# Patient Record
Sex: FEMALE | Race: WHITE | NOT HISPANIC OR LATINO | Employment: UNEMPLOYED | URBAN - METROPOLITAN AREA
[De-identification: names, ages, dates, MRNs, and addresses within clinical notes are randomized per-mention and may not be internally consistent; named-entity substitution may affect disease eponyms.]

---

## 2018-08-30 ENCOUNTER — OFFICE VISIT (OUTPATIENT)
Dept: INTERNAL MEDICINE | Facility: CLINIC | Age: 58
End: 2018-08-30
Payer: COMMERCIAL

## 2018-08-30 VITALS
DIASTOLIC BLOOD PRESSURE: 80 MMHG | TEMPERATURE: 98.2 F | HEART RATE: 100 BPM | WEIGHT: 188.1 LBS | BODY MASS INDEX: 29.52 KG/M2 | SYSTOLIC BLOOD PRESSURE: 126 MMHG | RESPIRATION RATE: 16 BRPM | OXYGEN SATURATION: 94 % | HEIGHT: 67 IN

## 2018-08-30 DIAGNOSIS — N39.0 URINARY TRACT INFECTION WITH HEMATURIA, SITE UNSPECIFIED: Primary | ICD-10-CM

## 2018-08-30 DIAGNOSIS — R30.0 DYSURIA: ICD-10-CM

## 2018-08-30 DIAGNOSIS — R82.90 NONSPECIFIC FINDING ON EXAMINATION OF URINE: ICD-10-CM

## 2018-08-30 DIAGNOSIS — R31.9 URINARY TRACT INFECTION WITH HEMATURIA, SITE UNSPECIFIED: Primary | ICD-10-CM

## 2018-08-30 LAB
ALBUMIN UR-MCNC: NEGATIVE MG/DL
APPEARANCE UR: ABNORMAL
BACTERIA #/AREA URNS HPF: ABNORMAL /HPF
BILIRUB UR QL STRIP: NEGATIVE
COLOR UR AUTO: YELLOW
GLUCOSE UR STRIP-MCNC: NEGATIVE MG/DL
HGB UR QL STRIP: ABNORMAL
KETONES UR STRIP-MCNC: NEGATIVE MG/DL
LEUKOCYTE ESTERASE UR QL STRIP: ABNORMAL
NITRATE UR QL: NEGATIVE
NON-SQ EPI CELLS #/AREA URNS LPF: ABNORMAL /LPF
PH UR STRIP: 6 PH (ref 5–7)
RBC #/AREA URNS AUTO: ABNORMAL /HPF
SOURCE: ABNORMAL
SP GR UR STRIP: 1.01 (ref 1–1.03)
UROBILINOGEN UR STRIP-ACNC: 0.2 EU/DL (ref 0.2–1)
WBC #/AREA URNS AUTO: ABNORMAL /HPF

## 2018-08-30 PROCEDURE — 81001 URINALYSIS AUTO W/SCOPE: CPT | Performed by: PHYSICIAN ASSISTANT

## 2018-08-30 PROCEDURE — 87088 URINE BACTERIA CULTURE: CPT | Performed by: PHYSICIAN ASSISTANT

## 2018-08-30 PROCEDURE — 87186 SC STD MICRODIL/AGAR DIL: CPT | Performed by: PHYSICIAN ASSISTANT

## 2018-08-30 PROCEDURE — 99203 OFFICE O/P NEW LOW 30 MIN: CPT | Performed by: PHYSICIAN ASSISTANT

## 2018-08-30 PROCEDURE — 87086 URINE CULTURE/COLONY COUNT: CPT | Performed by: PHYSICIAN ASSISTANT

## 2018-08-30 RX ORDER — SULFAMETHOXAZOLE/TRIMETHOPRIM 800-160 MG
1 TABLET ORAL 2 TIMES DAILY
Qty: 6 TABLET | Refills: 0 | Status: SHIPPED | OUTPATIENT
Start: 2018-08-30 | End: 2018-09-02

## 2018-08-30 NOTE — PROGRESS NOTES
"  SUBJECTIVE:   Keena Jorge is a 58 year old female who presents to clinic today for the following health issues:    New Patient/Transfer of Care. Pain and burning with urination x 3 days.    Patient is new to this clinic and has not been seen for the last 4 years due to dealing with family medical issues. She denies any significant medical history. She is here today due to UTI symptoms.     URINARY TRACT SYMPTOMS  Onset: 3 days    Description:   Painful urination (Dysuria): YES  Blood in urine (Hematuria): YES  Delay in urine (Hesitency): no     Intensity: moderate    Progression of Symptoms:  same    Accompanying Signs & Symptoms:  Fever/chills: no   Flank pain no   Nausea and vomiting: no   Any vaginal symptoms: none  Abdominal/Pelvic Pain: no     History:   History of frequent UTI's: no   History of kidney stones: no   Sexually Active: YES  Possibility of pregnancy: No    Precipitating factors:   Nothing     Therapies Tried and outcome: Increase fluid intake    Problem list and histories reviewed & adjusted, as indicated.  Additional history: as documented    BP Readings from Last 3 Encounters:   08/30/18 126/80    Wt Readings from Last 3 Encounters:   08/30/18 188 lb 1.6 oz (85.3 kg)         Reviewed and updated as needed this visit by clinical staff  Tobacco  Allergies  Meds  Med Hx  Surg Hx  Fam Hx  Soc Hx      Reviewed and updated as needed this visit by Provider         ROS:  Constitutional, HEENT, cardiovascular, pulmonary, gi and gu systems are negative, except as otherwise noted.    OBJECTIVE:     /80 (BP Location: Left arm, Patient Position: Chair, Cuff Size: Adult Large)  Pulse 100  Temp 98.2  F (36.8  C) (Oral)  Resp 16  Ht 5' 6.75\" (1.695 m)  Wt 188 lb 1.6 oz (85.3 kg)  SpO2 94%  BMI 29.68 kg/m2  Body mass index is 29.68 kg/(m^2).  GENERAL: healthy, alert and no distress  RESP: lungs clear to auscultation - no rales, rhonchi or wheezes  CV: regular rate and rhythm, normal S1 S2, no " S3 or S4, no murmur, click or rub, no peripheral edema and peripheral pulses strong  ABDOMEN: soft, nontender and bowel sounds normal  MS: no gross musculoskeletal defects noted, no edema  BACK: no CVA tenderness, no paralumbar tenderness    Diagnostic Test Results:  Results for orders placed or performed in visit on 08/30/18 (from the past 24 hour(s))   UA reflex to Microscopic and Culture   Result Value Ref Range    Color Urine Yellow     Appearance Urine Slightly Cloudy     Glucose Urine Negative NEG^Negative mg/dL    Bilirubin Urine Negative NEG^Negative    Ketones Urine Negative NEG^Negative mg/dL    Specific Gravity Urine 1.015 1.003 - 1.035    Blood Urine Large (A) NEG^Negative    pH Urine 6.0 5.0 - 7.0 pH    Protein Albumin Urine Negative NEG^Negative mg/dL    Urobilinogen Urine 0.2 0.2 - 1.0 EU/dL    Nitrite Urine Negative NEG^Negative    Leukocyte Esterase Urine Moderate (A) NEG^Negative    Source Midstream Urine    Urine Microscopic   Result Value Ref Range    WBC Urine 25-50 (A) OTO5^0 - 5 /HPF    RBC Urine  (A) OTO2^O - 2 /HPF    Squamous Epithelial /LPF Urine Few FEW^Few /LPF    Bacteria Urine Moderate (A) NEG^Negative /HPF       ASSESSMENT/PLAN:       ICD-10-CM    1. Urinary tract infection with hematuria, site unspecified N39.0 sulfamethoxazole-trimethoprim (BACTRIM DS/SEPTRA DS) 800-160 MG per tablet    R31.9 Urine Microscopic   2. Dysuria R30.0 UA reflex to Microscopic and Culture     Urine Culture Aerobic Bacterial     phenazopyridine (AZO) 97.5 MG tablet   3. Nonspecific finding on examination of urine R82.90 Urine Culture Aerobic Bacterial       I will treat for UTI and will follow up as indicated with culture results.   See Patient Instructions    Ashley Sol PA-C  [facility]

## 2018-08-30 NOTE — PATIENT INSTRUCTIONS
Understanding Urinary Tract Infections (UTIs)  Most UTIs are caused by bacteria, although they may also be caused by viruses or fungi. Bacteria from the bowel are the most common source of infection. The infection may start because of any of the following:    Sexual activity. During sex, bacteria can travel from the penis, vagina, or rectum into the urethra.     Bacteria on the skin outside the rectum may travel into the urethra. This is more common in women since the rectum and urethra are closer to each other than in men. Wiping from front to back after using the toilet and keeping the area clean can help prevent germs from getting to the urethra.    Blockage of urine flow through the urinary tract. If urine sits too long, germs may start to grow out of control.      Parts of the urinary tract  The infection can occur in any part of the urinary tract.    The kidneys collect and store urine.    The ureters carry urine from the kidneys to the bladder.    The bladder holds urine until you are ready to let it out.    The urethra carries urine from the bladder out of the body. It is shorter in women, so bacteria can move through it more easily. The urethra is longer in men, so a UTI is less likely to reach the bladder or kidneys in men.  Date Last Reviewed: 1/1/2017 2000-2017 [employer]. [address] [place] [address], [place] [address]. All rights reserved. This information is not intended as a substitute for professional medical care. Always follow your healthcare professional's instructions.

## 2018-09-01 LAB
BACTERIA SPEC CULT: ABNORMAL
SPECIMEN SOURCE: ABNORMAL

## 2018-09-20 ENCOUNTER — TELEPHONE (OUTPATIENT)
Dept: INTERNAL MEDICINE | Facility: CLINIC | Age: 58
End: 2018-09-20

## 2018-09-20 NOTE — LETTER
[facility]  [address], [address]  [place] [address]  [phone]        October 4, 2018    Keena Jorge  [address] [address] [facility] [address]            Dear Keena,  We sent you a letter a couple of weeks ago informing you of health maintenance that is due. We hope that you received it. This letter is just a follow up to remind you to schedule an appointment.       Sincerely,        Your [facility] Team

## 2018-09-20 NOTE — LETTER
[facility]  [address], [address]  [place] [address]  [phone]        September 20, 2018    Keena Jorge  [address] [address] [facility] [address]            Dear Ms. Keena Jorge:    In order to ensure we are providing the best quality care, we have reviewed your chart and see that you are due for a physical & pap.  Please call the clinic at your earliest convenience to schedule an appointment.   Thank you for trusting us with your health care.    Sincerely,        Dr. Phoenix Banerjee

## 2018-09-20 NOTE — TELEPHONE ENCOUNTER
Panel Management Review      Patient has the following on her problem list: None      Composite cancer screening  Chart review shows that this patient is due/due soon for the following Pap Smear  Summary:    Patient is due/failing the following:   PAP    Action needed:   Patient needs office visit for a physical & pap.    Type of outreach:    Sent letter.    Questions for provider review:    None                                                                                                                                    Natalee Zamora MA       Chart routed to none .

## 2018-10-22 ENCOUNTER — OFFICE VISIT (OUTPATIENT)
Dept: URGENT CARE | Facility: URGENT CARE | Age: 58
End: 2018-10-22
Payer: COMMERCIAL

## 2018-10-22 ENCOUNTER — RADIANT APPOINTMENT (OUTPATIENT)
Dept: GENERAL RADIOLOGY | Facility: CLINIC | Age: 58
End: 2018-10-22
Attending: PHYSICIAN ASSISTANT
Payer: COMMERCIAL

## 2018-10-22 VITALS
DIASTOLIC BLOOD PRESSURE: 82 MMHG | RESPIRATION RATE: 20 BRPM | SYSTOLIC BLOOD PRESSURE: 128 MMHG | OXYGEN SATURATION: 91 % | TEMPERATURE: 97.4 F | HEART RATE: 90 BPM

## 2018-10-22 DIAGNOSIS — R05.9 COUGH: ICD-10-CM

## 2018-10-22 DIAGNOSIS — R05.9 COUGH: Primary | ICD-10-CM

## 2018-10-22 DIAGNOSIS — R06.2 WHEEZING: ICD-10-CM

## 2018-10-22 PROCEDURE — 71046 X-RAY EXAM CHEST 2 VIEWS: CPT

## 2018-10-22 PROCEDURE — 94640 AIRWAY INHALATION TREATMENT: CPT | Performed by: PHYSICIAN ASSISTANT

## 2018-10-22 PROCEDURE — 99214 OFFICE O/P EST MOD 30 MIN: CPT | Mod: 25 | Performed by: PHYSICIAN ASSISTANT

## 2018-10-22 RX ORDER — PREDNISONE 20 MG/1
40 TABLET ORAL DAILY
Qty: 12 TABLET | Refills: 0 | Status: SHIPPED | OUTPATIENT
Start: 2018-10-22 | End: 2018-10-28

## 2018-10-22 RX ORDER — AZITHROMYCIN 250 MG/1
TABLET, FILM COATED ORAL
Qty: 6 TABLET | Refills: 0 | Status: SHIPPED | OUTPATIENT
Start: 2018-10-22 | End: 2019-08-09

## 2018-10-22 RX ORDER — ALBUTEROL SULFATE 90 UG/1
2 AEROSOL, METERED RESPIRATORY (INHALATION) EVERY 4 HOURS PRN
Qty: 1 INHALER | Refills: 0 | Status: SHIPPED | OUTPATIENT
Start: 2018-10-22 | End: 2019-08-09

## 2018-10-22 RX ORDER — IPRATROPIUM BROMIDE AND ALBUTEROL SULFATE 2.5; .5 MG/3ML; MG/3ML
1 SOLUTION RESPIRATORY (INHALATION) ONCE
Qty: 1 VIAL | Refills: 0
Start: 2018-10-22 | End: 2019-08-09

## 2018-10-22 NOTE — PROGRESS NOTES
SUBJECTIVE:  Keena Jorge is a 58 year old female who presents to the clinic today with a chief complaint of cough for a few weeks but seems to be worse at night and now with some wheezing and slightly SOB.  Has no underlying respiratory disease or asthma  No fevers Her cough is described as persistent, nonproductive, spasmodic and wheezing.    The patient's symptoms are moderate and worsening.  Associated symptoms include none. The patient's symptoms are exacerbated by lying down  Patient has been using OTC cough suppressants  to improve symptoms.    Generally  Healthy and no underlying medical issues and takes no daily med.      No past medical history on file.    Current Outpatient Prescriptions   Medication Sig Dispense Refill     phenazopyridine (AZO) 97.5 MG tablet Take 2 tablets (195 mg) by mouth 3 times daily (Patient not taking: Reported on 10/22/2018) 12 tablet 0       Social History   Substance Use Topics     Smoking status: Former Smoker     Types: Other     Quit date: 8/30/2016     Smokeless tobacco: Current User     Alcohol use Yes       ROS  Review of systems negative except as stated above.    OBJECTIVE:  /82 (BP Location: Right arm, Patient Position: Chair, Cuff Size: Adult Regular)  Pulse 90  Temp 97.4  F (36.3  C) (Tympanic)  Resp 20  SpO2 91%  GENERAL APPEARANCE: healthy, alert and no distress  EYES: EOMI,  PERRL, conjunctiva clear  HENT: ear canals and TM's normal.  Nose and mouth without ulcers, erythema or lesions  NECK: supple, nontender, no lymphadenopathy  RESP: wheezing diffusely throughout.  Course rhonchi noted.  No retractions.  CV: regular rates and rhythm, normal S1 S2, no murmur noted  NEURO: Normal strength and tone, sensory exam grossly normal,  normal speech and mentation  SKIN: no suspicious lesions or rashes    NEB:  Duoneb in the clinic with improved lung sound and repeat O2 95%.      Chest x-ray - no infiltrate noted.     assessment/plan:  (R05) Cough  (primary  encounter diagnosis)  Comment:   Plan: XR Chest 2 Views, predniSONE (DELTASONE) 20 MG         tablet, albuterol (PROAIR HFA/PROVENTIL         HFA/VENTOLIN HFA) 108 (90 Base) MCG/ACT         inhaler, azithromycin (ZITHROMAX) 250 MG tablet          Burst of Prednisone, inhaler and start Zithromax if sx worsen or fevers develop.  Red flag signs reviewed and OTC med for sx relief.  To Follow-up with PCP as needed     (R06.2) Wheezing  Comment:   Plan: INHALATION/NEBULIZER TREATMENT, INITIAL,         ipratropium - albuterol 0.5 mg/2.5 mg/3 mL         (DUONEB) 0.5-2.5 (3) MG/3ML neb solution,         predniSONE (DELTASONE) 20 MG tablet, albuterol         (PROAIR HFA/PROVENTIL HFA/VENTOLIN HFA) 108 (90        Base) MCG/ACT inhaler         As above

## 2019-04-24 ENCOUNTER — TELEPHONE (OUTPATIENT)
Dept: INTERNAL MEDICINE | Facility: CLINIC | Age: 59
End: 2019-04-24

## 2019-04-24 NOTE — LETTER
[facility]  [address], [address]  [place], [place]  [address]                                            TEL:[phone]  FAX:[phone]      Keena Jorge  [address] [address] [facility] [address]      April 29, 2019    Dear Keena,          At [facility], we care about your health and well-being. A review of your chart has indicated that you are due for a fasting physical, mammogram and colonoscopy. Please contact us at [phone] to schedule an appointment.     If you have already had one or all of the above screening tests at another facility, please call us to update your chart.        Sincerely,      [facility]

## 2019-04-29 NOTE — TELEPHONE ENCOUNTER
Panel Management Review      Patient has the following on her problem list: None      Composite cancer screening  Chart review shows that this patient is due/due soon for the following Mammogram and Colonoscopy  Summary:    Patient is due/failing the following:   COLONOSCOPY, MAMMOGRAM and PHYSICAL    Action needed:   Patient needs office visit for as above.    Type of outreach:    Sent letter.    Questions for provider review:    None                                                                                                                                    RUDDY Gong LPN       Chart routed to none.

## 2019-05-09 ENCOUNTER — OFFICE VISIT (OUTPATIENT)
Dept: PEDIATRICS | Facility: CLINIC | Age: 59
End: 2019-05-09
Payer: COMMERCIAL

## 2019-05-09 VITALS
HEART RATE: 106 BPM | OXYGEN SATURATION: 92 % | TEMPERATURE: 98.7 F | BODY MASS INDEX: 29.93 KG/M2 | WEIGHT: 189.7 LBS | SYSTOLIC BLOOD PRESSURE: 136 MMHG | DIASTOLIC BLOOD PRESSURE: 76 MMHG

## 2019-05-09 DIAGNOSIS — J06.9 VIRAL URI WITH COUGH: Primary | ICD-10-CM

## 2019-05-09 DIAGNOSIS — J44.1 COPD EXACERBATION (H): ICD-10-CM

## 2019-05-09 PROCEDURE — 99214 OFFICE O/P EST MOD 30 MIN: CPT | Performed by: FAMILY MEDICINE

## 2019-05-09 RX ORDER — PREDNISONE 20 MG/1
TABLET ORAL
Qty: 13 TABLET | Refills: 0 | Status: SHIPPED | OUTPATIENT
Start: 2019-05-10 | End: 2019-08-09

## 2019-05-09 RX ORDER — AZITHROMYCIN 250 MG/1
TABLET, FILM COATED ORAL
Qty: 6 TABLET | Refills: 0 | Status: SHIPPED | OUTPATIENT
Start: 2019-05-09 | End: 2019-08-09

## 2019-05-09 RX ORDER — PREDNISONE 10 MG/1
40 TABLET ORAL ONCE
Status: COMPLETED | OUTPATIENT
Start: 2019-05-09 | End: 2019-05-09

## 2019-05-09 RX ADMIN — PREDNISONE 40 MG: 10 TABLET ORAL at 15:26

## 2019-05-09 NOTE — PROGRESS NOTES
Subjective:   Keena Jorge is a 58 year old female who presents for   Chief Complaint   Patient presents with     Cough     Cough for x weeks - SOB mostly at night - same symptoms as 10/2018 visit     Denies fevers during this illness. No other sick individuals at home. No vomiting/diarrhea. She is feeling short of breath. No swelling of lower extremities.   No prescribed inhalers. Has not had PFTs done recently or ever. Does not have a PCP she identifies with.     Quit smoking 2 years ago - approximately 40 year smoking history but was averaging 1 pack per week.   Meds attempted: none  SH: [employer], not currently looking for work    There are no active problems to display for this patient.      Current Outpatient Medications   Medication     albuterol (PROAIR RESPICLICK) 108 (90 Base) MCG/ACT inhaler     azithromycin (ZITHROMAX) 250 MG tablet     [START ON 5/10/2019] predniSONE (DELTASONE) 20 MG tablet     albuterol (PROAIR HFA/PROVENTIL HFA/VENTOLIN HFA) 108 (90 Base) MCG/ACT inhaler     azithromycin (ZITHROMAX) 250 MG tablet     ipratropium - albuterol 0.5 mg/2.5 mg/3 mL (DUONEB) 0.5-2.5 (3) MG/3ML neb solution     No current facility-administered medications for this visit.        ROS:  As above per HPI    Objective:   /76 (BP Location: Right arm, Patient Position: Sitting, Cuff Size: Adult Regular)   Pulse 106   Temp 98.7  F (37.1  C) (Oral)   Wt 86 kg (189 lb 11.2 oz)   SpO2 92%   BMI 29.93 kg/m  , Body mass index is 29.93 kg/m .  Gen:  NAD, well-nourished, sitting in chair comfortably, normal mentation  HEENT: EOMI, sclera anicteric, Head normocephalic, ; nares patent; moist mucous membranes  Neck: trachea midline, no thyromegaly  CV:  Hemodynamically stable, RRR  Pulm:  Slight increased work of breathing, inspiratory wheezing in all fields, no obvious crackles  Chest: barrel chested  Extrem: no cyanosis, edema or clubbing  Skin: no obvious rashes or abnormalities  Psych: Euthymic, linear  thoughts, normal rate of speech  Gait: normal      Assessment & Plan:   Keena Jorge, 58 year old female who presents with:    Viral URI with cough  Suspected COPD exacerbation (H)  40mg of prednisone and a duoneb treatment were given at 1500  Outpatient plan: PRN albuterol, 10 day steroid taper prednisone, azithromycin for 5 days (suspected COPD exacerbation), OTC cough suppressants.   Has been absent of fevers. Previous visit her o2 level was in the low 90's. Today her O2 sat improved from 92-> 96% after above treatment which is encouraging.     Patient encouraged to have formal PFT testing done as outpatient once she gets better.     - azithromycin (ZITHROMAX) 250 MG tablet  Dispense: 6 tablet; Refill: 0  - albuterol (PROAIR RESPICLICK) 108 (90 Base) MCG/ACT inhaler  Dispense: 1 Inhaler; Refill: 2  - predniSONE (DELTASONE) 20 MG tablet  Dispense: 13 tablet; Refill: 0      George Carter MD   [facility]    The use of Dragon/[employer] dictation services may have been used to construct the content in this note; any grammatical or spelling errors are non-intentional. Please contact the author of this note directly if you are in need of any clarification.

## 2019-05-09 NOTE — PATIENT INSTRUCTIONS
Albuterol 2 puffs every 4-6 hours for shortness of breath    Take prednisone 40mg for 4 days then 20mg for 5 days    Take azithromycin for 5 days (2 pills on day 1)    For cough:   - robitussin (dextromethorphan)  - honey lozenges/remedies    Return if you have difficulty breathing, fevers, pain with breathing, or feel lightheaded      Make appointment with a Doctor in 2 weeks to review your health history. You would benefit from having a pulmonary function test done.

## 2019-05-09 NOTE — PROGRESS NOTES
The following nebulizer treatment was given:     MEDICATION: Duoneb  : [employer]  LOT #: [id number]  EXPIRATION DATE:  10/20  NDC # 9674-6024-55     Khalif Rankin MA on 5/9/2019 at 3:06 PM

## 2019-07-23 ENCOUNTER — OFFICE VISIT (OUTPATIENT)
Dept: URGENT CARE | Facility: URGENT CARE | Age: 59
End: 2019-07-23
Payer: COMMERCIAL

## 2019-07-23 VITALS
OXYGEN SATURATION: 95 % | HEART RATE: 82 BPM | RESPIRATION RATE: 20 BRPM | BODY MASS INDEX: 29.82 KG/M2 | DIASTOLIC BLOOD PRESSURE: 78 MMHG | SYSTOLIC BLOOD PRESSURE: 138 MMHG | WEIGHT: 189 LBS | TEMPERATURE: 97.4 F

## 2019-07-23 DIAGNOSIS — R10.11 RIGHT UPPER QUADRANT PAIN: ICD-10-CM

## 2019-07-23 DIAGNOSIS — R10.13 EPIGASTRIC PAIN: Primary | ICD-10-CM

## 2019-07-23 PROCEDURE — 99214 OFFICE O/P EST MOD 30 MIN: CPT | Performed by: FAMILY MEDICINE

## 2019-07-23 ASSESSMENT — ENCOUNTER SYMPTOMS
COUGH: 0
SHORTNESS OF BREATH: 0
DYSURIA: 0
FEVER: 0

## 2019-07-23 NOTE — PATIENT INSTRUCTIONS
Please follow up tomorrow for an ultrasound to look at your gallbladder.  This is scheduled at [facility] in [place] at 1:40 pm.  Please do not eat or drink anything for 8 hours before this.    One of my colleagues will contact you about the results and any needed follow up once the radiologist has looked at the ultrasound images.    Tonight, avoid fatty foods and try to drink plenty of clear fluids.    If pain worsens overnight, please seek care in the ER.

## 2019-07-24 ENCOUNTER — HOSPITAL ENCOUNTER (OUTPATIENT)
Dept: ULTRASOUND IMAGING | Facility: CLINIC | Age: 59
Discharge: HOME | End: 2019-07-24
Attending: FAMILY MEDICINE | Admitting: FAMILY MEDICINE
Payer: COMMERCIAL

## 2019-07-24 DIAGNOSIS — R10.13 EPIGASTRIC PAIN: ICD-10-CM

## 2019-07-24 DIAGNOSIS — R10.11 RIGHT UPPER QUADRANT PAIN: ICD-10-CM

## 2019-07-24 PROCEDURE — 76705 ECHO EXAM OF ABDOMEN: CPT

## 2019-08-09 ENCOUNTER — OFFICE VISIT (OUTPATIENT)
Dept: PEDIATRICS | Facility: CLINIC | Age: 59
End: 2019-08-09
Payer: COMMERCIAL

## 2019-08-09 ENCOUNTER — ANCILLARY PROCEDURE (OUTPATIENT)
Dept: MAMMOGRAPHY | Facility: CLINIC | Age: 59
End: 2019-08-09
Attending: INTERNAL MEDICINE
Payer: COMMERCIAL

## 2019-08-09 VITALS
DIASTOLIC BLOOD PRESSURE: 78 MMHG | OXYGEN SATURATION: 95 % | HEART RATE: 76 BPM | HEIGHT: 67 IN | SYSTOLIC BLOOD PRESSURE: 112 MMHG | BODY MASS INDEX: 28.72 KG/M2 | WEIGHT: 183 LBS

## 2019-08-09 DIAGNOSIS — Z83.49 FAMILY HISTORY OF THYROID DISEASE: ICD-10-CM

## 2019-08-09 DIAGNOSIS — Z12.4 SCREENING FOR CERVICAL CANCER: ICD-10-CM

## 2019-08-09 DIAGNOSIS — R19.5 POSITIVE FIT (FECAL IMMUNOCHEMICAL TEST): ICD-10-CM

## 2019-08-09 DIAGNOSIS — Z87.891 FORMER SMOKER: ICD-10-CM

## 2019-08-09 DIAGNOSIS — R06.2 WHEEZING: ICD-10-CM

## 2019-08-09 DIAGNOSIS — Z11.59 NEED FOR HEPATITIS C SCREENING TEST: ICD-10-CM

## 2019-08-09 DIAGNOSIS — I67.1 ANEURYSM OF LEFT INTERNAL CAROTID ARTERY: ICD-10-CM

## 2019-08-09 DIAGNOSIS — Z12.31 ENCOUNTER FOR SCREENING MAMMOGRAM FOR BREAST CANCER: ICD-10-CM

## 2019-08-09 DIAGNOSIS — Z23 NEED FOR TETANUS BOOSTER: ICD-10-CM

## 2019-08-09 DIAGNOSIS — Z13.220 SCREENING FOR HYPERLIPIDEMIA: ICD-10-CM

## 2019-08-09 DIAGNOSIS — Z13.1 SCREENING FOR DIABETES MELLITUS: ICD-10-CM

## 2019-08-09 DIAGNOSIS — D17.30 LIPOMA OF SKIN AND SUBCUTANEOUS TISSUE: ICD-10-CM

## 2019-08-09 DIAGNOSIS — E66.3 OVERWEIGHT (BMI 25.0-29.9): ICD-10-CM

## 2019-08-09 DIAGNOSIS — Z12.11 SPECIAL SCREENING FOR MALIGNANT NEOPLASMS, COLON: ICD-10-CM

## 2019-08-09 DIAGNOSIS — Z11.4 SCREENING FOR HIV WITHOUT PRESENCE OF RISK FACTORS: ICD-10-CM

## 2019-08-09 DIAGNOSIS — Z00.00 ROUTINE GENERAL MEDICAL EXAMINATION AT A HEALTH CARE FACILITY: Primary | ICD-10-CM

## 2019-08-09 PROBLEM — K64.4 EXTERNAL HEMORRHOIDS: Status: ACTIVE | Noted: 2019-08-09

## 2019-08-09 LAB
ALBUMIN SERPL-MCNC: 3.7 G/DL (ref 3.4–5)
ALP SERPL-CCNC: 84 U/L (ref 40–150)
ALT SERPL W P-5'-P-CCNC: 38 U/L (ref 0–50)
ANION GAP SERPL CALCULATED.3IONS-SCNC: 6 MMOL/L (ref 3–14)
AST SERPL W P-5'-P-CCNC: 18 U/L (ref 0–45)
BILIRUB SERPL-MCNC: 0.3 MG/DL (ref 0.2–1.3)
BUN SERPL-MCNC: 9 MG/DL (ref 7–30)
CALCIUM SERPL-MCNC: 9.1 MG/DL (ref 8.5–10.1)
CHLORIDE SERPL-SCNC: 108 MMOL/L (ref 94–109)
CHOLEST SERPL-MCNC: 210 MG/DL
CO2 SERPL-SCNC: 27 MMOL/L (ref 20–32)
CREAT SERPL-MCNC: 0.81 MG/DL (ref 0.52–1.04)
ERYTHROCYTE [DISTWIDTH] IN BLOOD BY AUTOMATED COUNT: 13.1 % (ref 10–15)
FEF 25/75: NORMAL
FEV-1: NORMAL
FEV1/FVC: NORMAL
FVC: NORMAL
GFR SERPL CREATININE-BSD FRML MDRD: 80 ML/MIN/{1.73_M2}
GLUCOSE SERPL-MCNC: 98 MG/DL (ref 70–99)
HBA1C MFR BLD: 5.5 % (ref 0–5.6)
HCT VFR BLD AUTO: 42.8 % (ref 35–47)
HCV AB SERPL QL IA: NONREACTIVE
HDLC SERPL-MCNC: 43 MG/DL
HGB BLD-MCNC: 13.9 G/DL (ref 11.7–15.7)
HIV 1+2 AB+HIV1 P24 AG SERPL QL IA: NONREACTIVE
LDLC SERPL CALC-MCNC: 137 MG/DL
MCH RBC QN AUTO: 28.9 PG (ref 26.5–33)
MCHC RBC AUTO-ENTMCNC: 32.5 G/DL (ref 31.5–36.5)
MCV RBC AUTO: 89 FL (ref 78–100)
NONHDLC SERPL-MCNC: 167 MG/DL
PLATELET # BLD AUTO: 215 10E9/L (ref 150–450)
POTASSIUM SERPL-SCNC: 3.9 MMOL/L (ref 3.4–5.3)
PROT SERPL-MCNC: 6.8 G/DL (ref 6.8–8.8)
RBC # BLD AUTO: 4.81 10E12/L (ref 3.8–5.2)
SODIUM SERPL-SCNC: 141 MMOL/L (ref 133–144)
TRIGL SERPL-MCNC: 152 MG/DL
TSH SERPL DL<=0.005 MIU/L-ACNC: 1.09 MU/L (ref 0.4–4)
WBC # BLD AUTO: 5.7 10E9/L (ref 4–11)

## 2019-08-09 PROCEDURE — 90471 IMMUNIZATION ADMIN: CPT | Performed by: INTERNAL MEDICINE

## 2019-08-09 PROCEDURE — 36415 COLL VENOUS BLD VENIPUNCTURE: CPT | Performed by: INTERNAL MEDICINE

## 2019-08-09 PROCEDURE — 90715 TDAP VACCINE 7 YRS/> IM: CPT | Performed by: INTERNAL MEDICINE

## 2019-08-09 PROCEDURE — 84443 ASSAY THYROID STIM HORMONE: CPT | Performed by: INTERNAL MEDICINE

## 2019-08-09 PROCEDURE — 80061 LIPID PANEL: CPT | Performed by: INTERNAL MEDICINE

## 2019-08-09 PROCEDURE — 83036 HEMOGLOBIN GLYCOSYLATED A1C: CPT | Performed by: INTERNAL MEDICINE

## 2019-08-09 PROCEDURE — 77067 SCR MAMMO BI INCL CAD: CPT | Mod: TC

## 2019-08-09 PROCEDURE — 87624 HPV HI-RISK TYP POOLED RSLT: CPT | Performed by: INTERNAL MEDICINE

## 2019-08-09 PROCEDURE — 87389 HIV-1 AG W/HIV-1&-2 AB AG IA: CPT | Performed by: INTERNAL MEDICINE

## 2019-08-09 PROCEDURE — 86803 HEPATITIS C AB TEST: CPT | Performed by: INTERNAL MEDICINE

## 2019-08-09 PROCEDURE — 99396 PREV VISIT EST AGE 40-64: CPT | Mod: 25 | Performed by: INTERNAL MEDICINE

## 2019-08-09 PROCEDURE — 85027 COMPLETE CBC AUTOMATED: CPT | Performed by: INTERNAL MEDICINE

## 2019-08-09 PROCEDURE — G0145 SCR C/V CYTO,THINLAYER,RESCR: HCPCS | Performed by: INTERNAL MEDICINE

## 2019-08-09 PROCEDURE — 94010 BREATHING CAPACITY TEST: CPT | Performed by: INTERNAL MEDICINE

## 2019-08-09 PROCEDURE — 99213 OFFICE O/P EST LOW 20 MIN: CPT | Mod: 25 | Performed by: INTERNAL MEDICINE

## 2019-08-09 PROCEDURE — 80053 COMPREHEN METABOLIC PANEL: CPT | Performed by: INTERNAL MEDICINE

## 2019-08-09 RX ORDER — FLUTICASONE PROPIONATE 44 UG/1
1 AEROSOL, METERED RESPIRATORY (INHALATION) 2 TIMES DAILY
Qty: 1 INHALER | Refills: 2 | Status: SHIPPED | OUTPATIENT
Start: 2019-08-09 | End: 2020-11-10

## 2019-08-09 ASSESSMENT — ENCOUNTER SYMPTOMS
HEADACHES: 0
NAUSEA: 0
HEARTBURN: 0
DIZZINESS: 0
SORE THROAT: 0
JOINT SWELLING: 0
HEMATURIA: 0
HEMATOCHEZIA: 0
PALPITATIONS: 0
SHORTNESS OF BREATH: 1
COUGH: 1
WEAKNESS: 0
NERVOUS/ANXIOUS: 0
DYSURIA: 0
DIARRHEA: 0
CHILLS: 0
CONSTIPATION: 0
EYE PAIN: 0
PARESTHESIAS: 0
FEVER: 0
BREAST MASS: 0
ABDOMINAL PAIN: 0
MYALGIAS: 0
FREQUENCY: 0
ARTHRALGIAS: 1

## 2019-08-09 ASSESSMENT — MIFFLIN-ST. JEOR: SCORE: 1442.94

## 2019-08-09 NOTE — PATIENT INSTRUCTIONS
It was nice to see you in clinic.    I'll be in touch with your lab results in the next 1-2 weeks.    I will review your Imaging from [facility] and be in touch with any recommendations regarding the aneurysm. We may want you to meet with a specialist again.     Mammogram - please call our office to schedule this.     Send back your FIT test - we'll do this every year for colon cancer screening.    For the breathing  - let's start a daily inhaler to see if this helps you use your albuterol less  - start flovent - 1 puff twice daily. After 2 weeks if not better can increase to 2 puffs twice daiy.   - okay to take your albuterol before swimming since you know chlorine is bothersome  - I will be in touch if your breathing test changes our plans at all  - I'll see you in 6 weeks to check in on how you're doing        If you are over 50 I recommend the new Shingrix vaccine. Two doses of Shingrix provides more than 90% protection against shingles and postherpetic neuralgia (PHN), a type of chronic pain that is the most common complication of shingles.   - even if you've had the older shingles vaccine (Zostavax) it's okay to get the new vaccine.   - even if you've had singles before the vaccine can be helpful.    Typically the best (and cheapest!) place to get this vaccine is our pharmacy downstairs. You do not need an appointment and can walk in any time they are open. The vaccine is a two shot series - I recommend getting the second shot 2-6 months after the first dose.    You may have a sore arm and feel mild flu-like symptoms for a day or two after the vaccine. Most people do not need to adjust their regular activities. It's okay to take tylenol or ibuprofen if you have side effects.     Preventive Health Recommendations  Female Ages 50 - 64    Yearly exam: See your health care provider every year in order to  o Review health changes.   o Discuss preventive care.    o Review your medicines if your doctor has  prescribed any.      Get a Pap test every three years (unless you have an abnormal result and your provider advises testing more often).    If you get Pap tests with HPV test, you only need to test every 5 years, unless you have an abnormal result.     You do not need a Pap test if your uterus was removed (hysterectomy) and you have not had cancer.    You should be tested each year for STDs (sexually transmitted diseases) if you're at risk.     Have a mammogram every 1 to 2 years.    Have a colonoscopy at age 50, or have a yearly FIT test (stool test). These exams screen for colon cancer.      Have a cholesterol test every 5 years, or more often if advised.    Have a diabetes test (fasting glucose) every three years. If you are at risk for diabetes, you should have this test more often.     If you are at risk for osteoporosis (brittle bone disease), think about having a bone density scan (DEXA).    Shots: Get a flu shot each year. Get a tetanus shot every 10 years.    Nutrition:     Eat at least 5 servings of fruits and vegetables each day.    Eat whole-grain bread, whole-wheat pasta and brown rice instead of white grains and rice.    Get adequate Calcium and Vitamin D.     Lifestyle    Exercise at least 150 minutes a week (30 minutes a day, 5 days a week). This will help you control your weight and prevent disease.    Limit alcohol to one drink per day.    No smoking.     Wear sunscreen to prevent skin cancer.     See your dentist every six months for an exam and cleaning.    See your eye doctor every 1 to 2 years.

## 2019-08-09 NOTE — LETTER
August 16, 2019    Keena Jorge  [address] [address] [facility] [address]    Dear ,  This letter is regarding your recent Pap smear (cervical cancer screening) and Human Papillomavirus (HPV) test.  We are happy to inform you that your Pap smear result is normal. Cervical cancer is closely linked with certain types of HPV. Your results showed no evidence of high-risk HPV.  We recommend you have your next PAP smear and HPV test in 5 years.  You will still need to return to the clinic every year for an annual exam and other preventive tests.  If you have additional questions regarding this result, please call our registered nurse, Francine at [phone].  Sincerely,    Cesar Jean MD /[facility]

## 2019-08-09 NOTE — LETTER
[facility]  [address] [address]                  [phone]   August 12, 2019    Keena Jorge  [address] [address] [facility] [address]      Dear Keena,      It was nice to see you in clinic. Enclosed are your lab results.     Your blood counts were normal.     Your hemoglobin a1c (screens for diabetes) was normal.     Your electrolytes, kidney function, and liver enzymes were normal.     Your cholesterol is high but still okay. Your 10-year ASCVD risk score (risk of heart attack or stroke in the next 10 years) is: 5.5% and the goal is <7.5%. Keep working on the diet/exercise like you are. For now I would not recommend any medications.   The 10-year ASCVD risk score (Timmyshalini SHIPLEY Jr., et al., 2013) is: 5.5%     Values used to calculate the score:       Age: 58 years       Sex: Female       Is Non- : No       Diabetic: No       Tobacco smoker: Yes       Systolic Blood Pressure: 112 mmHg       Is BP treated: No       HDL Cholesterol: 43 mg/dL       Total Cholesterol: 210 mg/dL     Your thyroid function was normal. With your family history we'll plan on checking this every few years.     Your standard one-time screening for hepatitis C (based on birth year) was negative.     Your standard one-time screening for HIV was negative.     Your test results are enclosed.      Please contact me if you have any questions.           Thank you very much for choosing [facility]    Best TROY garcia MD        Results for orders placed or performed in visit on 08/09/19   Spirometry, Breathing Capacity: Normal Order, Clinic Performed   Result Value Ref Range    FEV-1      FVC      FEV1/FVC      FEF 25/75     Hepatitis C Screen Reflex to HCV RNA Quant and Genotype   Result Value Ref Range    Hepatitis C Antibody Nonreactive NR^Nonreactive   HIV Screening   Result Value Ref Range    HIV Antigen Antibody Combo Nonreactive  NR^Nonreactive       Lipid panel reflex to direct LDL Fasting   Result Value Ref Range    Cholesterol 210 (H) <200 mg/dL    Triglycerides 152 (H) <150 mg/dL    HDL Cholesterol 43 (L) >49 mg/dL    LDL Cholesterol Calculated 137 (H) <100 mg/dL    Non HDL Cholesterol 167 (H) <130 mg/dL   Comprehensive metabolic panel   Result Value Ref Range    Sodium 141 133 - 144 mmol/L    Potassium 3.9 3.4 - 5.3 mmol/L    Chloride 108 94 - 109 mmol/L    Carbon Dioxide 27 20 - 32 mmol/L    Anion Gap 6 3 - 14 mmol/L    Glucose 98 70 - 99 mg/dL    Urea Nitrogen 9 7 - 30 mg/dL    Creatinine 0.81 0.52 - 1.04 mg/dL    GFR Estimate 80 >60 mL/min/[1.73_m2]    GFR Estimate If Black >90 >60 mL/min/[1.73_m2]    Calcium 9.1 8.5 - 10.1 mg/dL    Bilirubin Total 0.3 0.2 - 1.3 mg/dL    Albumin 3.7 3.4 - 5.0 g/dL    Protein Total 6.8 6.8 - 8.8 g/dL    Alkaline Phosphatase 84 40 - 150 U/L    ALT 38 0 - 50 U/L    AST 18 0 - 45 U/L   CBC with platelets   Result Value Ref Range    WBC 5.7 4.0 - 11.0 10e9/L    RBC Count 4.81 3.8 - 5.2 10e12/L    Hemoglobin 13.9 11.7 - 15.7 g/dL    Hematocrit 42.8 35.0 - 47.0 %    MCV 89 78 - 100 fl    MCH 28.9 26.5 - 33.0 pg    MCHC 32.5 31.5 - 36.5 g/dL    RDW 13.1 10.0 - 15.0 %    Platelet Count 215 150 - 450 10e9/L   TSH with free T4 reflex   Result Value Ref Range    TSH 1.09 0.40 - 4.00 mU/L   Hemoglobin A1c   Result Value Ref Range    Hemoglobin A1C 5.5 0 - 5.6 %

## 2019-08-09 NOTE — PROGRESS NOTES
SUBJECTIVE:   CC: Keena Jorge is an 58 year old woman who presents for preventive health visit.     Healthy Habits:     Getting at least 3 servings of Calcium per day:  NO    Bi-annual eye exam:  Yes    Dental care twice a year:  NO    Sleep apnea or symptoms of sleep apnea:  None    Diet:  Regular (no restrictions)    Frequency of exercise:  2-3 days/week    Duration of exercise:  Less than 15 minutes    Taking medications regularly:  Yes    Medication side effects:  Not applicable    PHQ-2 Total Score: 0    Additional concerns today:  Yes    # Breathing  - wonders about developing asthma  - chlorine is really bothersome (recently jointed the Y)  - taking albuterol throughout the week - most days uses it at least once. Some days more than once .  - wonders if humidity plays a role  - started last November - felt like she couldn't catch her breath  - doesn't feel sob   - no unexpected weight loss  - albuterol does help    # smoking  - quit smoking on December 29, 2017 - was 16 when she started, avg 1 ppd  - vapes infrequently - has a rafaela that makes it for her, only 3 mg.    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 Normal MR appearing internal auditory canals, cerebellopontine angles and labyrinthine structures bilaterally.  2. Minimal nonspecific supratentorial white matter T2 hyperintensity commonly attributed to chronic microvascular disease, age concordant and stable.  3. No posterior fossa lesion or acute abnormality.  - MRA Brain: 1.  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 2.  Mild chronic microvascular ischemic change.  - Dr. Luevano (Neuro) notes - I discussed with Dr. Shelley yesterday from [facility] who did review her MRA. My office will schedule an appointment with him to discuss the  option of coiling the aneurysm.  - She remembers seeing someone in [place] - does not recall the ultimate recommendation.    # HLD  - was on simvastatin in the past. Felt like she had side effects from this. Hesitant to consider another medication. Would like to work on diet/exercise.    # Social  - moved up to [place] in , in  moved back to [place] to care for her Mom, back to [place] in     Today's PHQ-2 Score:   PHQ-2 (  [employer]) 2019   Q1: Little interest or pleasure in doing things 0   Q2: Feeling down, depressed or hopeless 0   PHQ-2 Score 0   Q1: Little interest or pleasure in doing things Not at all   Q2: Feeling down, depressed or hopeless Not at all   PHQ-2 Score 0       Abuse: Current or Past(Physical, Sexual or Emotional)- No  Do you feel safe in your environment? Yes    Social History     Tobacco Use     Smoking status: Current Every Day Smoker     Types: Other     Last attempt to quit: 2016     Years since quittin.9     Smokeless tobacco: Never Used     Tobacco comment: Vape   Substance Use Topics     Alcohol use: Yes     Alcohol Use 2019   Prescreen: >3 drinks/day or >7 drinks/week? No     Reviewed orders with patient.  Reviewed health maintenance and updated orders accordingly - Yes  There is no problem list on file for this patient.    Past Surgical History:   Procedure Laterality Date     TONSILLECTOMY      2nd grade       Social History     Tobacco Use     Smoking status: Current Every Day Smoker     Types: Other     Last attempt to quit: 2016     Years since quittin.9     Smokeless tobacco: Never Used     Tobacco comment: Vape   Substance Use Topics     Alcohol use: Yes     Family History   Problem Relation Age of Onset     Thyroid Disease Mother         Grave's     Myocardial Infarction Mother 72     Chronic Obstructive Pulmonary Disease Father         smoker     Diabetes Maternal Grandmother 75     Lung Cancer Paternal Grandfather         non-smoker     Lymphoma  Brother      Thyroid Disease Sister      Diabetes Brother 50     Thyroid Disease Sister         Doesn't do anything about it     Thyroid Disease Sister         Borderline     Colon Cancer No family hx of      Breast Cancer No family hx of          Current Outpatient Medications   Medication Sig Dispense Refill     albuterol (PROAIR RESPICLICK) 108 (90 Base) MCG/ACT inhaler Inhale 2 puffs into the lungs every 4 hours as needed 1 Inhaler 3     fluticasone (FLOVENT HFA) 44 MCG/ACT inhaler Inhale 1 puff into the lungs 2 times daily 1 Inhaler 2     No Known Allergies    Mammogram Screening: Patient over age 50, mutual decision to screen reflected in health maintenance.    Pertinent mammograms are reviewed under the imaging tab.  History of abnormal Pap smear: NO - age 30-65 PAP every 5 years with negative HPV co-testing recommended     Reviewed and updated as needed this visit by clinical staff  Tobacco  Allergies  Meds  Med Hx  Surg Hx  Fam Hx  Soc Hx        Reviewed and updated as needed this visit by Provider  Meds  Med Hx  Surg Hx  Fam Hx        History reviewed. No pertinent past medical history.   Past Surgical History:   Procedure Laterality Date     TONSILLECTOMY      2nd grade       Review of Systems   Constitutional: Negative for chills and fever.   HENT: Positive for congestion. Negative for ear pain, hearing loss and sore throat.    Eyes: Negative for pain and visual disturbance.   Respiratory: Positive for cough and shortness of breath.    Cardiovascular: Negative for chest pain, palpitations and peripheral edema.   Gastrointestinal: Negative for abdominal pain, constipation, diarrhea, heartburn, hematochezia and nausea.   Breasts:  Negative for tenderness, breast mass and discharge.   Genitourinary: Negative for dysuria, frequency, genital sores, hematuria, pelvic pain, urgency, vaginal bleeding and vaginal discharge.   Musculoskeletal: Positive for arthralgias. Negative for joint swelling and  "myalgias.   Skin: Negative for rash.   Neurological: Negative for dizziness, weakness, headaches and paresthesias.   Psychiatric/Behavioral: Negative for mood changes. The patient is not nervous/anxious.      OBJECTIVE:   /78 (BP Location: Right arm, Patient Position: Sitting)   Pulse 76   Ht 1.694 m (5' 6.7\")   Wt 83 kg (183 lb)   SpO2 95%   BMI 28.92 kg/m       Physical Exam  GENERAL: healthy, alert and no distress  EYES: Eyes grossly normal to inspection, PERRL and conjunctivae and sclerae normal  HENT: ear canals and TM's normal, nose and mouth without ulcers or lesions  NECK: no adenopathy, no asymmetry, masses, or scars and thyroid normal to palpation  RESP: lungs clear to auscultation - no rales, rhonchi or wheezes  BREAST: normal without masses, tenderness or nipple discharge and no palpable axillary masses or adenopathy  CV: regular rate and rhythm, normal S1 S2, no S3 or S4, no murmur, click or rub, no peripheral edema and peripheral pulses strong  ABDOMEN: soft, nontender, no hepatosplenomegaly, no masses and bowel sounds normal   (female): normal female external genitalia, normal urethral meatus, vaginal mucosa pink, moist, well rugated, and normal cervix/adnexa/uterus without masses or discharge  RECTAL: external hemorrhoids present  MS: right upper extremity medial aspect near shoulder with 3x2cm soft tissue mass c/w lipoma, no gross musculoskeletal defects noted, no edema  SKIN: no suspicious lesions or rashes  NEURO: Normal strength and tone, mentation intact and speech normal  PSYCH: mentation appears normal, affect normal/bright    Diagnostic Test Results:  See results note.    Normal spirometry.    ASSESSMENT/PLAN:   1. Routine general medical examination at a health care facility  2. Overweight (BMI 25.0-29.9)  Working on diet/exercise.  - Comprehensive metabolic panel    3. Wheezing  4. Former smoker  New since November. She has noticed it is worse when swimming in heavily " chlorinated pools and when humid. No history of allergies. She is a former smoker/continues to vape. No sputum production. Gets relief from albuterol.   Spirometry normal today in clinic. Given frequent albuterol use will start a daily inhaler and f/u in 4-6 weeks.   She does not have any red flag signs (no weight loss, fevers, etc) but would consider chest x-ray and possibly CT given smoking history if symptoms not improved on daily inhaler.  We did NOT discuss screening for lung cancer today. However using www.shouldiscreen.com her risk of developing lung cancer in the next 6 years is <1.5% and the harms likely outweigh the risks. Should review at next OV.  - CBC with platelets  - Spirometry, Breathing Capacity: Normal Order, Clinic Performed  - fluticasone (FLOVENT HFA) 44 MCG/ACT inhaler; Inhale 1 puff into the lungs 2 times daily  Dispense: 1 Inhaler; Refill: 2  - albuterol (PROAIR RESPICLICK) 108 (90 Base) MCG/ACT inhaler; Inhale 2 puffs into the lungs every 4 hours as needed  Dispense: 1 Inhaler; Refill: 3    5. Aneurysm of left internal carotid artery  - MRA Brain: 1.  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 2.  Mild chronic microvascular ischemic change.  - Dr. Luevano (Neuro) notes - I discussed with Dr. Shelley yesterday from [facility] who did review her MRA. My office will schedule an appointment with him to discuss the option of coiling the aneurysm.  - She remembers seeing someone in [place] - does not recall the ultimate recommendation.  ROIs sent to [facility] and [place] [facility] - will likely need to have repeat MRI/MRA done and followed up by specialists (?IR vs NSG).     6. Lipoma of skin and subcutaneous tissue - right upper arm  Not changing over time and not bothering her.   She will let us know if she has any pain or it is growing -> would need imaging and surgical consult.    7. Screening for hyperlipidemia  Has been on  simvastatin in the past and had side effects. Reluctant to start something again. Would like to work on diet/exercise. Reviewed that my recommendation will depend on her levels.  - Lipid panel reflex to direct LDL Fasting    8. Screening for cervical cancer  - Pap imaged thin layer screen with HPV - recommended age 30 - 65  - HPV High Risk Types DNA Cervical    9. Encounter for screening mammogram for breast cancer  - MA SCREENING DIGITAL BILAT - Future  (s+30); Future    10. Need for hepatitis C screening test  - Hepatitis C Screen Reflex to HCV RNA Quant and Genotype    11. Screening for HIV without presence of risk factors  - HIV Screening    12. Screening for diabetes mellitus  - Comprehensive metabolic panel  - Hemoglobin A1c    13. Family history of thyroid disease  Plan to check every few years with significant family history.  - TSH with free T4 reflex    14. Special screening for malignant neoplasms, colon  - Fecal colorectal cancer screen (FIT); Future    15. Need for tetanus booster  - TDAP VACCINE  - ADMIN 1st VACCINE    -------------  PATIENT INSTRUCTIONS    It was nice to see you in clinic.    I'll be in touch with your lab results in the next 1-2 weeks.    I will review your Imaging from [facility] and be in touch with any recommendations regarding the aneurysm. We may want you to meet with a specialist again.     Mammogram - please call our office to schedule this.     Send back your FIT test - we'll do this every year for colon cancer screening.    For the breathing  - let's start a daily inhaler to see if this helps you use your albuterol less  - start flovent - 1 puff twice daily. After 2 weeks if not better can increase to 2 puffs twice daiy.   - okay to take your albuterol before swimming since you know chlorine is bothersome  - I will be in touch if your breathing test changes our plans at all  - I'll see you in 6 weeks to check in on how you're doing    -----------------------    Next  "OV  - f/u starting daily inhaler  ACT Total Scores 8/9/2019   ACT TOTAL SCORE (Goal Greater than or Equal to 20) 16   In the past 12 months, how many times did you visit the emergency room for your asthma without being admitted to the hospital? 2   In the past 12 months, how many times were you hospitalized overnight because of your asthma? 0   - We did NOT discuss screening for lung cancer today. However using www.shouldiscreen.com her risk of developing lung cancer in the next 6 years is <1.5% and the harms likely outweigh the risks.   - Come up with plan for history of aneurysm    COUNSELING:  Reviewed preventive health counseling, as reflected in patient instructions       Regular exercise       Healthy diet/nutrition       Immunizations    Vaccinated for: TDAP         Colon cancer screening       Consider Hep C screening for patients born between 1945 and 1965    Estimated body mass index is 28.92 kg/m  as calculated from the following:    Height as of this encounter: 1.694 m (5' 6.7\").    Weight as of this encounter: 83 kg (183 lb).    Weight management plan: Discussed healthy diet and exercise guidelines     reports that she has been smoking other.  She has never used smokeless tobacco.  Tobacco Cessation Action Plan: Information offered: Patient not interested at this time    Counseling Resources:  ATP IV Guidelines  Pooled Cohorts Equation Calculator  Breast Cancer Risk Calculator  FRAX Risk Assessment  ICSI Preventive Guidelines  Dietary Guidelines for Americans, 2010  USDA's MyPlate  ASA Prophylaxis  Lung CA Screening    Cesar Jean MD  [facility] [place] MICHAEL  "

## 2019-08-10 ASSESSMENT — ASTHMA QUESTIONNAIRES: ACT_TOTALSCORE: 16

## 2019-08-11 PROCEDURE — 82274 ASSAY TEST FOR BLOOD FECAL: CPT | Performed by: INTERNAL MEDICINE

## 2019-08-13 LAB
COPATH REPORT: NORMAL
PAP: NORMAL

## 2019-08-14 ENCOUNTER — DOCUMENTATION ONLY (OUTPATIENT)
Dept: PEDIATRICS | Facility: CLINIC | Age: 59
End: 2019-08-14

## 2019-08-14 DIAGNOSIS — Z12.11 SPECIAL SCREENING FOR MALIGNANT NEOPLASMS, COLON: ICD-10-CM

## 2019-08-14 LAB
FINAL DIAGNOSIS: NORMAL
HEMOCCULT STL QL IA: POSITIVE
HPV HR 12 DNA CVX QL NAA+PROBE: NEGATIVE
HPV16 DNA SPEC QL NAA+PROBE: NEGATIVE
HPV18 DNA SPEC QL NAA+PROBE: NEGATIVE
SPECIMEN DESCRIPTION: NORMAL
SPECIMEN SOURCE CVX/VAG CYTO: NORMAL

## 2019-08-14 NOTE — PROGRESS NOTES
Received note from [facility]- they do not have any records for Ms. Jorge.     TROY Jean MD  Internal Medicine-Pediatrics

## 2019-08-19 NOTE — PROGRESS NOTES
Please call [facility] to f/u on our RUT.     TROY Jean MD  Internal Medicine-Pediatrics

## 2019-08-26 NOTE — PROGRESS NOTES
I still have not received records - can we please call to make sure they received RUT and have pt records to send?     TROY Jean MD  Internal Medicine-Pediatrics

## 2019-09-04 NOTE — PROGRESS NOTES
Records from [facility] are in placed in Provider's box.     //Dee Carroll MA// September 4, 2019 11:33 AM

## 2019-09-05 ENCOUNTER — TELEPHONE (OUTPATIENT)
Dept: PEDIATRICS | Facility: CLINIC | Age: 59
End: 2019-09-05

## 2019-09-05 DIAGNOSIS — I67.1 ANEURYSM OF LEFT INTERNAL CAROTID ARTERY: Primary | ICD-10-CM

## 2019-09-05 NOTE — PROGRESS NOTES
Received records from [facility] - 12/2013 -- incidental 4 mm aneurysm of left ophthalmic artery. The plan was to review images with the neuro interventionalist to see if coiling is feasible or necessary.    TROY Jean MD  Internal Medicine-Pediatrics

## 2019-09-06 NOTE — TELEPHONE ENCOUNTER
Please let pt know that I was able to get records from [facility] but [facility] did not have any records for her.     Regardless because it has been so long since her last imaging I'd like to start by repeating her brain MRA so we can see what things look like now. Depending on what this shows we can make sure she gets to the right specialist (if she needs to see one).     Orders placed - radiology should call her to schedule.     TROY Jean MD  Internal Medicine-Pediatrics

## 2019-09-06 NOTE — TELEPHONE ENCOUNTER
Called patient and notified of message below. Provided patient with Radiology phone number if she doesn't hear from them by next week. Patient verbalized understanding and in agreement with plan of care.

## 2019-09-12 ENCOUNTER — HOSPITAL ENCOUNTER (OUTPATIENT)
Facility: CLINIC | Age: 59
Discharge: HOME | End: 2019-09-12
Attending: INTERNAL MEDICINE | Admitting: INTERNAL MEDICINE
Payer: COMMERCIAL

## 2019-09-12 VITALS
HEART RATE: 69 BPM | SYSTOLIC BLOOD PRESSURE: 111 MMHG | HEIGHT: 67 IN | RESPIRATION RATE: 14 BRPM | WEIGHT: 176 LBS | DIASTOLIC BLOOD PRESSURE: 68 MMHG | OXYGEN SATURATION: 91 % | BODY MASS INDEX: 27.62 KG/M2

## 2019-09-12 LAB — COLONOSCOPY: NORMAL

## 2019-09-12 PROCEDURE — 25000128 H RX IP 250 OP 636: Performed by: INTERNAL MEDICINE

## 2019-09-12 PROCEDURE — 45385 COLONOSCOPY W/LESION REMOVAL: CPT | Performed by: INTERNAL MEDICINE

## 2019-09-12 PROCEDURE — 88305 TISSUE EXAM BY PATHOLOGIST: CPT | Mod: 26 | Performed by: INTERNAL MEDICINE

## 2019-09-12 PROCEDURE — 88305 TISSUE EXAM BY PATHOLOGIST: CPT | Performed by: INTERNAL MEDICINE

## 2019-09-12 PROCEDURE — G0500 MOD SEDAT ENDO SERVICE >5YRS: HCPCS | Performed by: INTERNAL MEDICINE

## 2019-09-12 RX ORDER — LIDOCAINE 40 MG/G
CREAM TOPICAL
Status: DISCONTINUED | OUTPATIENT
Start: 2019-09-12 | End: 2019-09-12 | Stop reason: HOSPADM

## 2019-09-12 RX ORDER — ONDANSETRON 4 MG/1
4 TABLET, ORALLY DISINTEGRATING ORAL EVERY 6 HOURS PRN
Status: DISCONTINUED | OUTPATIENT
Start: 2019-09-12 | End: 2019-09-12 | Stop reason: HOSPADM

## 2019-09-12 RX ORDER — ONDANSETRON 2 MG/ML
4 INJECTION INTRAMUSCULAR; INTRAVENOUS
Status: COMPLETED | OUTPATIENT
Start: 2019-09-12 | End: 2019-09-12

## 2019-09-12 RX ORDER — ONDANSETRON 2 MG/ML
4 INJECTION INTRAMUSCULAR; INTRAVENOUS EVERY 6 HOURS PRN
Status: DISCONTINUED | OUTPATIENT
Start: 2019-09-12 | End: 2019-09-12 | Stop reason: HOSPADM

## 2019-09-12 RX ORDER — FENTANYL CITRATE 50 UG/ML
INJECTION, SOLUTION INTRAMUSCULAR; INTRAVENOUS PRN
Status: DISCONTINUED | OUTPATIENT
Start: 2019-09-12 | End: 2019-09-12 | Stop reason: HOSPADM

## 2019-09-12 RX ORDER — NALOXONE HYDROCHLORIDE 0.4 MG/ML
.1-.4 INJECTION, SOLUTION INTRAMUSCULAR; INTRAVENOUS; SUBCUTANEOUS
Status: DISCONTINUED | OUTPATIENT
Start: 2019-09-12 | End: 2019-09-12 | Stop reason: HOSPADM

## 2019-09-12 RX ORDER — FLUMAZENIL 0.1 MG/ML
0.2 INJECTION, SOLUTION INTRAVENOUS
Status: DISCONTINUED | OUTPATIENT
Start: 2019-09-12 | End: 2019-09-12 | Stop reason: HOSPADM

## 2019-09-12 ASSESSMENT — MIFFLIN-ST. JEOR: SCORE: 1403.02

## 2019-09-12 NOTE — LETTER
August 16, 2019      Keena Jorge  [address] [facility] [address]        Dear Keena,         Thank you for choosing [facility]. You are scheduled for the following service(s).   Please be aware that coverage of these services is subject to the terms and limitations of your health insurance plan.  Call member services at your health plan with any benefit or coverage questions.    Date:  8-30-19             Procedure:  COLONOSCOPY  Doctor:        Umair   Arrival Time:  0830  *Check in at Emergency/Endoscopy desk*  Procedure Time:  0900      Location:   [facility]        Endoscopy Department, First Floor (Enter through ER Doors) *        [address] [place], [place] [address] [phone] or [phone] () to reschedule      MIRALAX -GATORADE  PREP  Colonoscopy is the most accurate test to detect colon polyps and colon cancer; and the only test where polyps can be removed. During this procedure, a doctor examines the lining of your large intestine and rectum through a flexible tube.   Transportation  You must arrange for a ride for the day of your procedure with a responsible adult. A taxi , Uber, etc, is not an option unless you are accompanied by a responsible adult. If you fail to arrange transportation with a responsible adult, your procedure will be cancelled and rescheduled.    Purchase the  following supplies at your local pharmacy:  - 2 (two) bisacodyl tablets: each tablet contains 5 mg.  (Dulcolax  laxative NOT Dulcolax  stool softener)   - 1 (one) 8.3 oz bottle of Polyethylene Glycol (PEG) 3350 Powder   (MiraLAX , Smooth LAX , ClearLAX  or equivalent)  - 64 oz Gatorade    Regular Gatorade, Gatorade G2 , Powerade , Powerade Zero  or Pedialyte  is acceptable. Red colored flavors are not allowed; all other colors (yellow, green, orange, purple and blue) are okay. It is also okay to buy two 2.12 oz packets of powdered Gatorade that  can be mixed with water to a total volume of 64 oz of liquid.  - 1 (one) 10 oz bottle of Magnesium Citrate (Red colored flavors are not allowed)  It is also okay for you to use a 0.5 oz package of powdered magnesium citrate (17 g) mixed with 10 oz of water.      PREPARATION FOR COLONOSCOPY    7 days before:    Discontinue fiber supplements and medications containing iron. This includes Metamucil  and Fibercon ; and multivitamins with iron.    3 days before:    Begin a low-fiber diet. A low-fiber diet helps making the cleanout more effective.     Examples of a low-fiber diet include (but are not limited to): white bread, white rice, pasta, crackers, fish, chicken, eggs, ground beef, creamy peanut butter, cooked/steamed/boiled vegetables, canned fruit, bananas, melons, milk, plain yogurt cheese, salad dressing and other condiments.     The following are not allowed on a low-fiber diet: seeds, nuts, popcorn, bran, whole wheat, corn, quinoa, raw fruits and vegetables, berries and dried fruit, beans and lentils.    For additional details on low-fiber diet, please refer to the table on the last page.    2 days before:    Continue the low-fiber diet.     Drink at least 8 glasses of water throughout the day.     Stop eating solid foods at 11:45 pm.  1.   2. 1 day before:    In the morning: begin a clear liquid diet (liquids you can see through).     Examples of a clear liquid diet include: water, clear broth or bouillon, Gatorade, Pedialyte or Powerade, carbonated and non-carbonated soft drinks (Sprite , 7-Up , ginger ale), strained fruit juices without pulp (apple, white grape, white cranberry), Jell-O  and popsicles.     The following are not allowed on a clear liquid diet: red liquids, alcoholic beverages, dairy products (milk, creamer, and yogurt), protein shakes, creamy broths, juice with pulp and chewing tobacco.    At noon: take 2 (two) bisacodyl tablets     At 4 (and no later than 6pm): start drinking the  Miralax-Gatorade preparation (8.3 oz of Miralax mixed with 64 oz of Gatorade in a large pitcher). Drink 1(one) 8 oz glass every 15 minutes thereafter, until the mixture is gone.    COLON CLEANSING TIPS: drink adequate amounts of fluids before and after your colon cleansing to prevent dehydration. Stay near a toilet because you will have diarrhea. Even if you are sitting on the toilet, continue to drink the cleansing solution every 15 minutes. If you feel nauseous or vomit, rinse your mouth with water, take a 15 to 30-minute-break and then continue drinking the solution. You will be uncomfortable until the stool has flushed from your colon (in about 2 to 4 hours). You may feel chilled.    Day of your procedure  You may take all of your morning medications including blood pressure medications, blood thinners (if you have not been instructed to stop these by our office), methadone, anti-seizure medications with sips of water 3 hours prior to your procedure or earlier. Do not take insulin or vitamins prior to your procedure. Continue the clear liquid diet.       4 hours prior: drink 10 oz of magnesium citrate. It may be easier to drink it with a straw.    STOP consuming all liquids after that.     Do not take anything by mouth during this time.     Allow extra time to travel to your procedure as you may need to stop and use a restroom along the way.    You are ready for the procedure, if you followed all instructions and your stool is no longer formed, but clear or yellow liquid. If you are unsure whether your colon is clean, please call our office at [phone] before you leave for your appointment.    Bring the following to your procedure:  - Insurance Card/Photo ID.   - List of current medications including over-the-counter medications and supplements.   - Your rescue inhaler if you currently use one to control asthma.      Canceling or rescheduling your appointment:   If you must cancel or reschedule your  appointment, please call [phone] as soon as possible.      COLONOSCOPY PRE-PROCEDURE CHECKLIST    If you have diabetes, ask your regular doctor for diet and medication restrictions.  If you take an anticoagulant or anti-platelet medication (such as Coumadin , Lovenox , Pradaxa , Xarelto , Eliquis , etc.), please call your primary doctor for advice on holding this medication.  If you take aspirin you may continue to do so.  If you are or may be pregnant, please discuss the risks and benefits of this procedure with your doctor.        What happens during a colonoscopy?    Plan to spend up to two hours, starting at registration time, at the endoscopy center the day of your procedure. The colonoscopy takes an average of 15 to 30 minutes. Recovery time is about 30 minutes.      Before the exam:    You will change into a gown.    Your medical history and medication list will be reviewed with you, unless that has been done over the phone prior to the procedure.     A nurse will insert an intravenous (IV) line into your hand or arm.    The doctor will meet with you and will give you a consent form to sign.  1. During the exam:     Medicine will be given through the IV line to help you relax.     Your heart rate and oxygen levels will be monitored. If your blood pressure is low, you may be given fluids through the IV line.     The doctor will insert a flexible hollow tube, called a colonoscope, into your rectum. The scope will be advanced slowly through the large intestine (colon).    You may have a feeling of fullness or pressure.     If an abnormal tissue or a polyp is found, the doctor may remove it through the endoscope for closer examination, or biopsy. Tissue removal is painless    After the exam:           Any tissue samples removed during the exam will be sent to a lab for evaluation. It may take 5-7 working days for you to be notified of the results.     A nurse will provide you with complete discharge  instructions before you leave the endoscopy center. Be sure to ask the nurse for specific instructions if you take blood thinners such as Aspirin, Coumadin or Plavix.     The doctor will prepare a full report for you and for the physician who referred you for the procedure.     Your doctor will talk with you about the initial results of your exam.      Medication given during the exam will prohibit you from driving for the rest of the day.     Following the exam, you may resume your normal diet. Your first meal should be light, no greasy foods. Avoid alcohol until the next day.     You may resume your regular activities the day after the procedure.         LOW-FIBER DIET    Foods RECOMMENDED Foods to AVOID   Breads, Cereal, Rice and Pasta:   White bread, rolls, biscuits, croissant and reynaldo toast.   Waffles, Irish toast and pancakes.   White rice, noodles, pasta, macaroni and peeled cooked potatoes.   Plain crackers and saltines.   Cooked cereals: farina, cream of rice.   Cold cereals: Puffed Rice , Rice Krispies , Corn Flakes  and Special K    Breads, Cereal, Rice and Pasta:   Breads or rolls with nuts, seeds or fruit.   Whole wheat, pumpernickel, rye breads and cornbread.   Potatoes with skin, brown or wild rice, and kasha (buckwheat).     Vegetables:   Tender cooked and canned vegetables without seeds: carrots, asparagus tips, green or wax beans, pumpkin, spinach, lima beans. Vegetables:   Raw or steamed vegetables.   Vegetables with seeds.   Sauerkraut.   Winter squash, peas, broccoli, Brussel sprouts, cabbage, onions, cauliflower, baked beans, peas and corn.   Fruits:   Strained fruit juice.   Canned fruit, except pineapple.   Ripe bananas and melon. Fruits:   Prunes and prune juice.   Raw fruits.   Dried fruits: figs, dates and raisins.   Milk/Dairy:   Milk: plain or flavored.   Yogurt, custard and ice cream.   Cheese and cottage cheese Milk/Dairy:     Meat and other proteins:   ground, well-cooked tender  beef, lamb, ham, veal, pork, fish, poultry and organ meats.   Eggs.   Peanut butter without nuts. Meat and other proteins:   Tough, fibrous meats with gristle.   Dry beans, peas and lentils.   Peanut butter with nuts.   Tofu.   Fats, Snack, Sweets, Condiments and Beverages:   Margarine, butter, oils, mayonnaise, sour cream and salad dressing, plain gravy.   Sugar, hard candy, clear jelly, honey and syrup.   Spices, cooked herbs, bouillon, broth and soups made with allowed vegetable, ketchup and mustard.   Coffee, tea and carbonated drinks.   Plain cakes, cookies and pretzels.   Gelatin, plain puddings, custard, ice cream, sherbet and popsicles. Fats, Snack, Sweets, Condiments and Beverages:   Nuts, seeds and coconut.   Jam, marmalade and preserves.   Pickles, olives, relish and horseradish.   All desserts containing nuts, seeds, dried fruit and coconut; or made from whole grains or bran.   Candy made with nuts or seeds.   Popcorn.                     DIRECTIONS TO THE ENDOSCOPY DEPARTMENT     From the north ([facility])  Take [address] South, exit on [address] [address]. Get into the left hand rosa, turn left (east), go one-half mile to [address] and turn left. Go north to the first stoplight, take a right on [address] and follow it to the Emergency entrance.    From the south ([facility])  Take 35N to the 35E split and exit on [address] [address]. On [address] [address], turn left (west) to [address]. Turn right (north) on [address]. Go north to the first stoplight, take a right on [address] and follow it to the Emergency entrance.    From the east via 35E ([facility])  Take [address]E south to [address] [address] exit. Turn right on [address] [address]. Go west to [address]. Turn right (north) on [address]. Go to the first stoplight, take a right and follow on [address] to the Emergency entrance.    From the east via [address] ([facility])  [address]  to [address]. Turn left (south) on [address] to [address]. Turn left (east) on [address] and follow it to the Emergency entrance.    From the west via [address] ([place], [place])  Take [address] east to [address]. Turn right (south) on [address] to [address]. Turn left (east) on [address] and follow it to the Emergency entrance.

## 2019-09-12 NOTE — H&P
Pre-Endoscopy History and Physical     Keena Jorge MRN# [id number]   YOB: 1960 Age: 59 year old     Date of Procedure: 9/12/2019  Primary care provider: Cesar Jean  Type of Endoscopy: Colonoscopy with possible biopsy, possible polypectomy  Reason for Procedure: screen  Type of Anesthesia Anticipated: Conscious Sedation    HPI:    Keena is a 59 year old female who will be undergoing the above procedure.      A history and physical has been performed. The patient's medications and allergies have been reviewed. The risks and benefits of the procedure and the sedation options and risks were discussed with the patient.  All questions were answered and informed consent was obtained.      She denies a personal or family history of anesthesia complications or bleeding disorders.     Patient Active Problem List   Diagnosis     Tobacco use     Hyperlipidemia     Overweight (BMI 25.0-29.9)     External hemorrhoids     Lipoma of skin and subcutaneous tissue - right upper arm     Aneurysm of left internal carotid artery        History reviewed. No pertinent past medical history.     Past Surgical History:   Procedure Laterality Date     TONSILLECTOMY      2nd grade       Social History     Tobacco Use     Smoking status: Current Every Day Smoker     Types: Other     Start date: 8/19/1975     Last attempt to quit: 8/30/2016     Years since quitting: 3.0     Smokeless tobacco: Never Used     Tobacco comment: Vape   Substance Use Topics     Alcohol use: Yes     Comment: occa       Family History   Problem Relation Age of Onset     Thyroid Disease Mother         Grave's     Myocardial Infarction Mother 72     Chronic Obstructive Pulmonary Disease Father         smoker     Diabetes Maternal Grandmother 75     Lung Cancer Paternal Grandfather         non-smoker     Lymphoma Brother      Thyroid Disease Sister      Diabetes Brother 50     Thyroid Disease Sister         Doesn't do anything about it      "Thyroid Disease Sister         Borderline     Colon Cancer No family hx of      Breast Cancer No family hx of        Prior to Admission medications    Medication Sig Start Date End Date Taking? Authorizing Provider   albuterol (PROAIR RESPICLICK) 108 (90 Base) MCG/ACT inhaler Inhale 2 puffs into the lungs every 4 hours as needed 8/9/19  Yes Cesar Jean MD   fluticasone (FLOVENT HFA) 44 MCG/ACT inhaler Inhale 1 puff into the lungs 2 times daily 8/9/19  Yes Cesar Jean MD       No Known Allergies     REVIEW OF SYSTEMS:   5 point ROS negative except as noted above in HPI, including Gen., Resp., CV, GI &  system review.    PHYSICAL EXAM:   /81   Pulse 72   Resp 16   Ht 1.689 m (5' 6.5\")   Wt 79.8 kg (176 lb)   SpO2 93%   BMI 27.98 kg/m   Estimated body mass index is 27.98 kg/m  as calculated from the following:    Height as of this encounter: 1.689 m (5' 6.5\").    Weight as of this encounter: 79.8 kg (176 lb).   GENERAL APPEARANCE: alert, and oriented  MENTAL STATUS: alert  AIRWAY EXAM: Mallampatti Class I (visualization of the soft palate, fauces, uvula, anterior and posterior pillars)  RESP: lungs clear to auscultation - no rales, rhonchi or wheezes  CV: regular rates and rhythm  DIAGNOSTICS:    Not indicated    IMPRESSION   ASA Class 1 - Healthy patient, no medical problems    PLAN:   Plan for Colonoscopy with possible biopsy, possible polypectomy. We discussed the risks, benefits and alternatives and the patient wished to proceed.    The above has been forwarded to the consulting provider.      Signed Electronically by: Eddie Block MD  September 12, 2019          "

## 2019-09-12 NOTE — DISCHARGE INSTRUCTIONS
Understanding Colon and Rectal Polyps     The colon has a smooth lining composed of millions of cells.     The colon (also called the large intestine) is a muscular tube that forms the last part of the digestive tract. It absorbs water and stores food waste. The colon is about 4 to 6 feet long. The rectum is the last 6 inches of the colon. The colon and rectum have a smooth lining composed of millions of cells. Changes in these cells can lead to growths in the colon that can become cancerous and should be removed.     When the Colon Lining Changes  Changes that occur in the cells that line the colon or rectum can lead to growths called polyps. Over a period of years, polyps can turn cancerous. Removing polyps early may prevent cancer from ever forming.      Polyps  Polyps are fleshy clumps of tissue that form on the lining of the colon or rectum. Small polyps are usually benign (not cancerous). However, over time, cells in a polyp can change and become cancerous. The larger a polyp grows, the more likely this is to happen. Also, certain types of polyps known as adenomatous polyps are considered premalignant. This means that they will almost always become cancerous if they re not removed.          Cancer  Almost all colorectal cancers start when polyp cells begin growing abnormally. As a cancerous tumor grows, it may involve more and more of the colon or rectum. In time, cancer can also grow beyond the colon or rectum and spread to nearby organs or to glands called lymph nodes. The cells can also travel to other parts of the body. This is known as metastasis. The earlier a cancerous tumor is removed, the better the chance of preventing its spread.        [id number] [facility], [address] [place] [address], [place] [address]. All rights reserved. This information is not intended as a substitute for professional medical care. Always follow your healthcare professional's instructions.

## 2019-09-13 PROBLEM — D36.9 TUBULOVILLOUS ADENOMA: Status: ACTIVE | Noted: 2019-09-13

## 2019-09-13 LAB — COPATH REPORT: NORMAL

## 2020-11-09 DIAGNOSIS — R06.2 WHEEZING: ICD-10-CM

## 2020-11-10 RX ORDER — FLUTICASONE PROPIONATE 44 UG/1
1 AEROSOL, METERED RESPIRATORY (INHALATION) 2 TIMES DAILY
Qty: 1 INHALER | Refills: 0 | Status: SHIPPED | OUTPATIENT
Start: 2020-11-10 | End: 2021-01-07

## 2020-11-10 NOTE — TELEPHONE ENCOUNTER
Pt didn't want to come in clinic. Scheduled a vv med check.    Kecia Gates on 11/10/2020 at 3:37 PM

## 2020-11-10 NOTE — TELEPHONE ENCOUNTER
Called patient no answer, could not leave message due to voice mailbox being full.     Will attempt patient again next week.     Yen Hemphill CMA

## 2020-11-10 NOTE — TELEPHONE ENCOUNTER
Medication is being filled for 1 time refill only due to:  Patient needs to be seen because it has been more than one year since last visit.    Please call patient and assist with scheduling prior to additional refills.    Lucinda KAUR - Registered Nurse  [facility]  Acute and Diagnostic Services

## 2020-11-11 DIAGNOSIS — R06.2 WHEEZING: ICD-10-CM

## 2020-11-11 NOTE — TELEPHONE ENCOUNTER
Routing refill request to provider for review/approval because:  Patient needs to be seen because it has been more than 1 year since last office visit.  Has appt but not til 1/7/21    Eleanor Del Rosario RN

## 2020-11-12 RX ORDER — ALBUTEROL SULFATE 90 UG/1
2 POWDER, METERED RESPIRATORY (INHALATION) EVERY 4 HOURS PRN
Qty: 1 INHALER | Refills: 0 | Status: SHIPPED | OUTPATIENT
Start: 2020-11-12 | End: 2021-01-07

## 2021-01-07 ENCOUNTER — VIRTUAL VISIT (OUTPATIENT)
Dept: PEDIATRICS | Facility: CLINIC | Age: 61
End: 2021-01-07
Payer: COMMERCIAL

## 2021-01-07 ENCOUNTER — TELEPHONE (OUTPATIENT)
Dept: PEDIATRICS | Facility: CLINIC | Age: 61
End: 2021-01-07

## 2021-01-07 DIAGNOSIS — R06.2 WHEEZING: Primary | ICD-10-CM

## 2021-01-07 DIAGNOSIS — Z13.1 SCREENING FOR DIABETES MELLITUS: ICD-10-CM

## 2021-01-07 DIAGNOSIS — Z72.0 TOBACCO USE: ICD-10-CM

## 2021-01-07 DIAGNOSIS — E78.2 MIXED HYPERLIPIDEMIA: ICD-10-CM

## 2021-01-07 DIAGNOSIS — K76.0 FATTY LIVER: ICD-10-CM

## 2021-01-07 DIAGNOSIS — I67.1 ANEURYSM OF LEFT INTERNAL CAROTID ARTERY: ICD-10-CM

## 2021-01-07 DIAGNOSIS — Z87.891 FORMER SMOKER: ICD-10-CM

## 2021-01-07 DIAGNOSIS — Z13.220 SCREENING CHOLESTEROL LEVEL: ICD-10-CM

## 2021-01-07 PROBLEM — K80.20 CHOLELITHIASES: Status: ACTIVE | Noted: 2021-01-07

## 2021-01-07 PROCEDURE — 99213 OFFICE O/P EST LOW 20 MIN: CPT | Mod: 95 | Performed by: INTERNAL MEDICINE

## 2021-01-07 RX ORDER — FLUTICASONE PROPIONATE 44 UG/1
1 AEROSOL, METERED RESPIRATORY (INHALATION) 2 TIMES DAILY
Qty: 1 INHALER | Refills: 4 | Status: SHIPPED | OUTPATIENT
Start: 2021-01-07 | End: 2021-06-30

## 2021-01-07 RX ORDER — ALBUTEROL SULFATE 90 UG/1
2 POWDER, METERED RESPIRATORY (INHALATION) EVERY 4 HOURS PRN
Qty: 1 INHALER | Refills: 4 | Status: SHIPPED | OUTPATIENT
Start: 2021-01-07 | End: 2021-06-30

## 2021-01-07 NOTE — PROGRESS NOTES
Keena Jorge is a 60 year old female who is being evaluated via a billable telephone visit.      What phone number would you like to be contacted at? [phone]  How would you like to obtain your AVS? Mail a copy  Assessment & Plan   (R06.2) Wheezing  (primary encounter diagnosis)  (Z72.0) Tobacco use  (Z87.891) Former smoker  Comment: spriometry did not show obstruction but significantly improved with flovent. Smoking history - could be copd vs asthma (worse w/ chlorine). For now will continue as she has improved significantly.   Plan: albuterol (PROAIR RESPICLICK) 108 (90 Base)         MCG/ACT inhaler, fluticasone (FLOVENT HFA) 44         MCG/ACT inhaler    (I67.1) Aneurysm of left internal carotid artery  Comment: Recommend repeating imaging - she would like to wait until covid improves.     (E78.2) Mixed hyperlipidemia  (Z13.220) Screening cholesterol level  Plan: Lipid panel reflex to direct LDL Fasting    (Z13.1) Screening for diabetes mellitus  Plan: **Comprehensive metabolic panel FUTURE anytime    (K76.0) Fatty liver on ultrasound  Plan: **Comprehensive metabolic panel FUTURE anytime    Return in about 5 months (around 6/7/2021) for Wellness Visit.    Cesar Jean MD  [facility]    Subjective     Keena Jorge is a 60 year old who presents to clinic today for the following health issues     HPI     Medication Followup of proair respiclick    Taking Medication as prescribed: yes    Side Effects:  None    Medication Helping Symptoms:  yes     Last OV 8/2019  3. Wheezing  4. Former smoker  New since November. She has noticed it is worse when swimming in heavily chlorinated pools and when humid. No history of allergies. She is a former smoker/continues to vape. No sputum production. Gets relief from albuterol.   Spirometry normal today in clinic. Given frequent albuterol use will start a daily inhaler and f/u in 4-6 weeks.   She does not have any red flag signs (no weight  loss, fevers, etc) but would consider chest x-ray and possibly CT given smoking history if symptoms not improved on daily inhaler.  We did NOT discuss screening for lung cancer today. However using www.shouldiscreen.com her risk of developing lung cancer in the next 6 years is <1.5% and the harms likely outweigh the risks. Should review at next OV.  - CBC with platelets  - Spirometry, Breathing Capacity: Normal Order, Clinic Performed  - fluticasone (FLOVENT HFA) 44 MCG/ACT inhaler; Inhale 1 puff into the lungs 2 times daily  Dispense: 1 Inhaler; Refill: 2  - albuterol (PROAIR RESPICLICK) 108 (90 Base) MCG/ACT inhaler; Inhale 2 puffs into the lungs every 4 hours as needed  Dispense: 1 Inhaler; Refill: 3     5. Aneurysm of left internal carotid artery  - MRA Brain: 1.  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 2.  Mild chronic microvascular ischemic change.  - Dr. Luevano (Neuro) notes - I discussed with Dr. Shelley yesterday from [facility] who did review her MRA. My office will schedule an appointment with him to discuss the option of coiling the aneurysm.  - She remembers seeing someone in [place] - does not recall the ultimate recommendation.  ROIs sent to [facility] and [place] [facility] - will likely need to have repeat MRI/MRA done and followed up by specialists (?IR vs NSG).      6. Lipoma of skin and subcutaneous tissue - right upper arm  Not changing over time and not bothering her.   She will let us know if she has any pain or it is growing -> would need imaging and surgical consult.     7. Screening for hyperlipidemia  Has been on simvastatin in the past and had side effects. Reluctant to start something again. Would like to work on diet/exercise. Reviewed that my recommendation will depend on her levels.  - Lipid panel reflex to direct LDL Fasting    # Wheezing  - rare albuterol use  - much much better with flovent.    ACT Total Scores  8/9/2019   ACT TOTAL SCORE (Goal Greater than or Equal to 20) 16   In the past 12 months, how many times did you visit the emergency room for your asthma without being admitted to the hospital? 2   In the past 12 months, how many times were you hospitalized overnight because of your asthma? 0     # Aneurysm left carotid artery  - would like to wait until post covid to reimage.    # Social   - was in [place] for 2 weeks, sons and grandchildren are down there    Review of Systems   Constitutional, HEENT, cardiovascular, pulmonary, gi and gu systems are negative, except as otherwise noted.      Objective           Vitals:  No vitals were obtained today due to virtual visit.    Physical Exam   healthy, alert and no distress  PSYCH: Alert and oriented times 3; coherent speech, normal   rate and volume, able to articulate logical thoughts, able   to abstract reason, no tangential thoughts, no hallucinations   or delusions  Her affect is normal  RESP: No cough, no audible wheezing, able to talk in full sentences  Remainder of exam unable to be completed due to telephone visits            Phone call duration: 6 minutes

## 2021-01-07 NOTE — TELEPHONE ENCOUNTER
Prior Authorization Retail Medication Request    Medication/Dose: albuterol (PROAIR RESPICLICK) 108 (90 Base) MCG/ACT inhaler  ICD code (if different than what is on RX):  R06.2  Previously Tried and Failed:    Rationale:      Insurance Name:  [facility] OUT OF STATE    Insurance ID:  [id number]        Pharmacy Information (if different than what is on RX)  Name:  [facility], [place]  Phone:  [phone]

## 2021-01-08 ASSESSMENT — ASTHMA QUESTIONNAIRES: ACT_TOTALSCORE: 23

## 2021-01-11 NOTE — TELEPHONE ENCOUNTER
Central Prior Authorization Team   Phone: [phone]      PA Initiation    Medication: albuterol (PROAIR RESPICLICK) 108 (90 Base) MCG/ACT inhaler  Insurance Company: [employer] - Phone [phone] Fax [phone]  Pharmacy Filling the Rx: -[facility] [address] [place] - [address]  Filling Pharmacy Phone: [phone]  Filling Pharmacy Fax:    Start Date: 1/11/2021

## 2021-01-11 NOTE — TELEPHONE ENCOUNTER
Prior Authorization Approval    Authorization Effective Date: 12/12/2020  Authorization Expiration Date: 1/11/2022  Medication: albuterol (PROAIR RESPICLICK) 108 (90 Base) MCG/ACT inhaler  Approved Dose/Quantity:    Reference #:     Insurance Company: [employer] - Phone [phone] Fax [phone]  Expected CoPay:       CoPay Card Available:      Foundation Assistance Needed:    Which Pharmacy is filling the prescription (Not needed for infusion/clinic administered): [facility] [address] [place] - [address]  Pharmacy Notified: Yes  Patient Notified: Yes  **Instructed pharmacy to notify patient when script is ready to /ship.**

## 2021-03-14 NOTE — MR AVS SNAPSHOT
After Visit Summary   10/22/2018    Keena Jorge    MRN: [id number]           Patient Information     Date Of Birth          1960        Visit Information        Provider Department      10/22/2018 11:05 AM Carlita Ventura PA-C [facility]        Today's Diagnoses     Cough    -  1    Wheezing           Follow-ups after your visit        Who to contact     If you have questions or need follow up information about today's clinic visit or your schedule please contact [facility] directly at [phone].  Normal or non-critical lab and imaging results will be communicated to you by MyChart, letter or phone within 4 business days after the clinic has received the results. If you do not hear from us within 7 days, please contact the clinic through MyChart or phone. If you have a critical or abnormal lab result, we will notify you by phone as soon as possible.  Submit refill requests through [employer] or call your pharmacy and they will forward the refill request to us. Please allow 3 business days for your refill to be completed.          Additional Information About Your Visit        Care EveryWhere ID     This is your Care EveryWhere ID. This could be used by other organizations to access your [facility] medical records  [id number]        Your Vitals Were     Pulse Temperature Respirations Pulse Oximetry          90 97.4  F (36.3  C) (Tympanic) 20 91%         Blood Pressure from Last 3 Encounters:   10/22/18 128/82   08/30/18 126/80    Weight from Last 3 Encounters:   08/30/18 188 lb 1.6 oz (85.3 kg)              We Performed the Following     INHALATION/NEBULIZER TREATMENT, INITIAL          Today's Medication Changes          These changes are accurate as of 10/22/18 11:59 PM.  If you have any questions, ask your nurse or doctor.               Start taking these medicines.        Dose/Directions    albuterol 108 (90 Base) MCG/ACT inhaler   Commonly known as:   PROAIR HFA/PROVENTIL HFA/VENTOLIN HFA   Used for:  Cough, Wheezing   Started by:  Carlita Ventura PA-C        Dose:  2 puff   Inhale 2 puffs into the lungs every 4 hours as needed for shortness of breath / dyspnea or wheezing   Quantity:  1 Inhaler   Refills:  0       azithromycin 250 MG tablet   Commonly known as:  ZITHROMAX   Used for:  Cough   Started by:  Carlita Ventura PA-C        Two tablets first day, then one tablet daily for four days.   Quantity:  6 tablet   Refills:  0       ipratropium - albuterol 0.5 mg/2.5 mg/3 mL 0.5-2.5 (3) MG/3ML neb solution   Commonly known as:  DUONEB   Used for:  Wheezing   Started by:  Carlita Ventura PA-C        Dose:  1 vial   Take 1 vial (3 mLs) by nebulization once for 1 dose   Quantity:  1 vial   Refills:  0       predniSONE 20 MG tablet   Commonly known as:  DELTASONE   Used for:  Cough, Wheezing   Started by:  Carlita Ventura PA-C        Dose:  40 mg   Take 2 tablets (40 mg) by mouth daily for 6 days   Quantity:  12 tablet   Refills:  0            Where to get your medicines      These medications were sent to [id number] - [place], [place] - [address]  [address] [address]     Phone:  [phone]     albuterol 108 (90 Base) MCG/ACT inhaler    azithromycin 250 MG tablet    predniSONE 20 MG tablet         Some of these will need a paper prescription and others can be bought over the counter.  Ask your nurse if you have questions.     You don't need a prescription for these medications     ipratropium - albuterol 0.5 mg/2.5 mg/3 mL 0.5-2.5 (3) MG/3ML neb solution                Primary Care Provider Fax #    Physician No Ref-Primary [phone]       No address on file        Equal Access to Services     TEGAN CHICAS AH: Dany Em, laurel romero, beka heard. [facility] [phone].    ATENCIÓN: Si cintia kearney,  tiene a vigil disposición servicios gratuitos de asistencia lingüística. Gaudencio kearney [phone].    We comply with applicable federal civil rights laws and [place] laws. We do not discriminate on the basis of race, color, national origin, age, disability, sex, sexual orientation, or gender identity.            Thank you!     Thank you for choosing [facility]  for your care. Our goal is always to provide you with excellent care. Hearing back from our patients is one way we can continue to improve our services. Please take a few minutes to complete the written survey that you may receive in the mail after your visit with us. Thank you!             Your Updated Medication List - Protect others around you: Learn how to safely use, store and throw away your medicines at www.disposemymeds.org.          This list is accurate as of 10/22/18 11:59 PM.  Always use your most recent med list.                   Brand Name Dispense Instructions for use Diagnosis    albuterol 108 (90 Base) MCG/ACT inhaler    PROAIR HFA/PROVENTIL HFA/VENTOLIN HFA    1 Inhaler    Inhale 2 puffs into the lungs every 4 hours as needed for shortness of breath / dyspnea or wheezing    Cough, Wheezing       azithromycin 250 MG tablet    ZITHROMAX    6 tablet    Two tablets first day, then one tablet daily for four days.    Cough       ipratropium - albuterol 0.5 mg/2.5 mg/3 mL 0.5-2.5 (3) MG/3ML neb solution    DUONEB    1 vial    Take 1 vial (3 mLs) by nebulization once for 1 dose    Wheezing       predniSONE 20 MG tablet    DELTASONE    12 tablet    Take 2 tablets (40 mg) by mouth daily for 6 days    Cough, Wheezing          Additional Safety/Bands:

## 2021-06-29 NOTE — PROGRESS NOTES
Last VV 1/2021  (R06.2) Wheezing  (primary encounter diagnosis)  (Z72.0) Tobacco use  (Z87.891) Former smoker  Comment: spriometry did not show obstruction but significantly improved with flovent. Smoking history - could be copd vs asthma (worse w/ chlorine). For now will continue as she has improved significantly.   Plan: albuterol (PROAIR RESPICLICK) 108 (90 Base)         MCG/ACT inhaler, fluticasone (FLOVENT HFA) 44         MCG/ACT inhaler     (I67.1) Aneurysm of left internal carotid artery  Comment: Recommend repeating imaging - she would like to wait until covid improves.      (E78.2) Mixed hyperlipidemia  (Z13.220) Screening cholesterol level  Plan: Lipid panel reflex to direct LDL Fasting     (Z13.1) Screening for diabetes mellitus  Plan: **Comprehensive metabolic panel FUTURE anytime     (K76.0) Fatty liver on ultrasound  Plan: **Comprehensive metabolic panel FUTURE anytime      # Healthy Weight    # HLD    # Fatty liver    # HCM

## 2021-06-30 ENCOUNTER — OFFICE VISIT (OUTPATIENT)
Dept: PEDIATRICS | Facility: CLINIC | Age: 61
End: 2021-06-30
Payer: COMMERCIAL

## 2021-06-30 ENCOUNTER — ANCILLARY PROCEDURE (OUTPATIENT)
Dept: GENERAL RADIOLOGY | Facility: CLINIC | Age: 61
End: 2021-06-30
Attending: INTERNAL MEDICINE
Payer: COMMERCIAL

## 2021-06-30 VITALS
HEART RATE: 79 BPM | DIASTOLIC BLOOD PRESSURE: 76 MMHG | OXYGEN SATURATION: 96 % | HEIGHT: 67 IN | BODY MASS INDEX: 29.82 KG/M2 | TEMPERATURE: 97 F | RESPIRATION RATE: 14 BRPM | WEIGHT: 190 LBS | SYSTOLIC BLOOD PRESSURE: 116 MMHG

## 2021-06-30 DIAGNOSIS — I67.1 ANEURYSM OF LEFT INTERNAL CAROTID ARTERY: ICD-10-CM

## 2021-06-30 DIAGNOSIS — G89.29 CHRONIC RIGHT SHOULDER PAIN: ICD-10-CM

## 2021-06-30 DIAGNOSIS — G89.29 CHRONIC LEFT SHOULDER PAIN: ICD-10-CM

## 2021-06-30 DIAGNOSIS — E78.2 MIXED HYPERLIPIDEMIA: ICD-10-CM

## 2021-06-30 DIAGNOSIS — Z13.1 SCREENING FOR DIABETES MELLITUS: ICD-10-CM

## 2021-06-30 DIAGNOSIS — K76.0 FATTY LIVER: ICD-10-CM

## 2021-06-30 DIAGNOSIS — Z13.220 SCREENING CHOLESTEROL LEVEL: ICD-10-CM

## 2021-06-30 DIAGNOSIS — M25.512 CHRONIC LEFT SHOULDER PAIN: ICD-10-CM

## 2021-06-30 DIAGNOSIS — M25.511 CHRONIC RIGHT SHOULDER PAIN: ICD-10-CM

## 2021-06-30 DIAGNOSIS — Z00.00 ROUTINE GENERAL MEDICAL EXAMINATION AT A HEALTH CARE FACILITY: Primary | ICD-10-CM

## 2021-06-30 DIAGNOSIS — K80.80 BILIARY CALCULUS OF OTHER SITE WITHOUT OBSTRUCTION: ICD-10-CM

## 2021-06-30 DIAGNOSIS — R06.2 WHEEZING: ICD-10-CM

## 2021-06-30 DIAGNOSIS — Z12.31 ENCOUNTER FOR SCREENING MAMMOGRAM FOR BREAST CANCER: ICD-10-CM

## 2021-06-30 LAB
ALBUMIN SERPL-MCNC: 3.6 G/DL (ref 3.4–5)
ALP SERPL-CCNC: 99 U/L (ref 40–150)
ALT SERPL W P-5'-P-CCNC: 31 U/L (ref 0–50)
ANION GAP SERPL CALCULATED.3IONS-SCNC: 6 MMOL/L (ref 3–14)
AST SERPL W P-5'-P-CCNC: 19 U/L (ref 0–45)
BILIRUB SERPL-MCNC: 0.2 MG/DL (ref 0.2–1.3)
BUN SERPL-MCNC: 9 MG/DL (ref 7–30)
CALCIUM SERPL-MCNC: 9 MG/DL (ref 8.5–10.1)
CHLORIDE SERPL-SCNC: 108 MMOL/L (ref 94–109)
CHOLEST SERPL-MCNC: 252 MG/DL
CO2 SERPL-SCNC: 26 MMOL/L (ref 20–32)
CREAT SERPL-MCNC: 0.71 MG/DL (ref 0.52–1.04)
ERYTHROCYTE [DISTWIDTH] IN BLOOD BY AUTOMATED COUNT: 12.8 % (ref 10–15)
GFR SERPL CREATININE-BSD FRML MDRD: >90 ML/MIN/{1.73_M2}
GLUCOSE SERPL-MCNC: 108 MG/DL (ref 70–99)
HCT VFR BLD AUTO: 41.8 % (ref 35–47)
HDLC SERPL-MCNC: 47 MG/DL
HGB BLD-MCNC: 13.8 G/DL (ref 11.7–15.7)
LDLC SERPL CALC-MCNC: 160 MG/DL
MCH RBC QN AUTO: 29.1 PG (ref 26.5–33)
MCHC RBC AUTO-ENTMCNC: 33 G/DL (ref 31.5–36.5)
MCV RBC AUTO: 88 FL (ref 78–100)
NONHDLC SERPL-MCNC: 205 MG/DL
PLATELET # BLD AUTO: 187 10E9/L (ref 150–450)
POTASSIUM SERPL-SCNC: 4 MMOL/L (ref 3.4–5.3)
PROT SERPL-MCNC: 7 G/DL (ref 6.8–8.8)
RBC # BLD AUTO: 4.74 10E12/L (ref 3.8–5.2)
SODIUM SERPL-SCNC: 140 MMOL/L (ref 133–144)
TRIGL SERPL-MCNC: 223 MG/DL
WBC # BLD AUTO: 4.7 10E9/L (ref 4–11)

## 2021-06-30 PROCEDURE — 85027 COMPLETE CBC AUTOMATED: CPT | Performed by: INTERNAL MEDICINE

## 2021-06-30 PROCEDURE — 90471 IMMUNIZATION ADMIN: CPT | Performed by: INTERNAL MEDICINE

## 2021-06-30 PROCEDURE — 73030 X-RAY EXAM OF SHOULDER: CPT | Mod: LT | Performed by: RADIOLOGY

## 2021-06-30 PROCEDURE — 80053 COMPREHEN METABOLIC PANEL: CPT | Performed by: INTERNAL MEDICINE

## 2021-06-30 PROCEDURE — 99213 OFFICE O/P EST LOW 20 MIN: CPT | Mod: 25 | Performed by: INTERNAL MEDICINE

## 2021-06-30 PROCEDURE — 80061 LIPID PANEL: CPT | Performed by: INTERNAL MEDICINE

## 2021-06-30 PROCEDURE — 90750 HZV VACC RECOMBINANT IM: CPT | Performed by: INTERNAL MEDICINE

## 2021-06-30 PROCEDURE — 99396 PREV VISIT EST AGE 40-64: CPT | Mod: 25 | Performed by: INTERNAL MEDICINE

## 2021-06-30 PROCEDURE — 36415 COLL VENOUS BLD VENIPUNCTURE: CPT | Performed by: INTERNAL MEDICINE

## 2021-06-30 RX ORDER — FLUTICASONE PROPIONATE 44 UG/1
1 AEROSOL, METERED RESPIRATORY (INHALATION) 2 TIMES DAILY
Qty: 10.6 G | Refills: 4 | Status: SHIPPED | OUTPATIENT
Start: 2021-06-30 | End: 2021-10-12

## 2021-06-30 RX ORDER — ALBUTEROL SULFATE 90 UG/1
2 POWDER, METERED RESPIRATORY (INHALATION) EVERY 4 HOURS PRN
Qty: 2 EACH | Refills: 3 | Status: SHIPPED | OUTPATIENT
Start: 2021-06-30 | End: 2021-10-12

## 2021-06-30 ASSESSMENT — ENCOUNTER SYMPTOMS
ABDOMINAL PAIN: 0
DIZZINESS: 0
PARESTHESIAS: 0
FEVER: 0
NERVOUS/ANXIOUS: 0
DYSURIA: 0
BREAST MASS: 0
HEARTBURN: 0
SHORTNESS OF BREATH: 0
MYALGIAS: 1
HEMATURIA: 0
SORE THROAT: 0
NAUSEA: 0
CHILLS: 0
HEADACHES: 0
CONSTIPATION: 0
PALPITATIONS: 0
DIARRHEA: 0
ARTHRALGIAS: 1
COUGH: 0
FREQUENCY: 0
EYE PAIN: 0
HEMATOCHEZIA: 0
WEAKNESS: 0
JOINT SWELLING: 0

## 2021-06-30 ASSESSMENT — MIFFLIN-ST. JEOR: SCORE: 1469.46

## 2021-06-30 NOTE — PATIENT INSTRUCTIONS
Reclaim the treadmill! I love your idea of walking.     Shoulders  - xray of the left today  - physical therapy for both - call me if not starting to improve after a month or two and I would refer you to Orthopedics. They will call you to schedule.     Wheezing/Lungs  - can restart the flovent late summer - call me if needing to use the albuterol more than a few times a week     Aneurysm  We'll talk next year about repeating imaging - probably should do it at some point but I don't think it's emergent    Shingles Vaccine  If you are over 50 I recommend the new Shingrix vaccine. Two doses of Shingrix provides more than 90% protection against shingles and postherpetic neuralgia (PHN), a type of chronic pain that is the most common complication of shingles.   - even if you've had the older shingles vaccine (Zostavax) it's okay to get the new vaccine.   - even if you've had singles before the vaccine can be helpful.    The vaccine is a two shot series - I recommend getting the second shot 2-6 months after the first dose.    You may have a sore arm and feel mild flu-like symptoms for a day or two after the vaccine. Most people do not need to adjust their regular activities. It's okay to take tylenol or ibuprofen if you have side effects.       Preventive Health Recommendations  Female Ages 50 - 64    Yearly exam: See your health care provider every year in order to  o Review health changes.   o Discuss preventive care.    o Review your medicines if your doctor has prescribed any.      Get a Pap test every three years (unless you have an abnormal result and your provider advises testing more often).    If you get Pap tests with HPV test, you only need to test every 5 years, unless you have an abnormal result.     You do not need a Pap test if your uterus was removed (hysterectomy) and you have not had cancer.    You should be tested each year for STDs (sexually transmitted diseases) if you're at risk.     Have a mammogram  every 1 to 2 years.    Have a colonoscopy at age 50, or have a yearly FIT test (stool test). These exams screen for colon cancer.      Have a cholesterol test every 5 years, or more often if advised.    Have a diabetes test (fasting glucose) every three years. If you are at risk for diabetes, you should have this test more often.     If you are at risk for osteoporosis (brittle bone disease), think about having a bone density scan (DEXA).    Shots: Get a flu shot each year. Get a tetanus shot every 10 years.    Nutrition:     Eat at least 5 servings of fruits and vegetables each day.    Eat whole-grain bread, whole-wheat pasta and brown rice instead of white grains and rice.    Get adequate Calcium and Vitamin D.     Lifestyle    Exercise at least 150 minutes a week (30 minutes a day, 5 days a week). This will help you control your weight and prevent disease.    Limit alcohol to one drink per day.    No smoking.     Wear sunscreen to prevent skin cancer.     See your dentist every six months for an exam and cleaning.    See your eye doctor every 1 to 2 years.

## 2021-06-30 NOTE — PROGRESS NOTES
SUBJECTIVE:   CC: Keena Jorge is an 60 year old woman who presents for preventive health visit.     Patient has been advised of split billing requirements and indicates understanding: Yes  Healthy Habits:     Getting at least 3 servings of Calcium per day:  NO    Bi-annual eye exam:  Yes    Dental care twice a year:  NO    Sleep apnea or symptoms of sleep apnea:  None    Diet:  Regular (no restrictions)    Frequency of exercise:  None    Taking medications regularly:  Yes    Medication side effects:  None    PHQ-2 Total Score: 0    Additional concerns today:  No    Last VV 1/2021  (R06.2) Wheezing  (primary encounter diagnosis)  (Z72.0) Tobacco use  (Z87.891) Former smoker  Comment: spriometry did not show obstruction but significantly improved with flovent. Smoking history - could be copd vs asthma (worse w/ chlorine). For now will continue as she has improved significantly.   Plan: albuterol (PROAIR RESPICLICK) 108 (90 Base)         MCG/ACT inhaler, fluticasone (FLOVENT HFA) 44         MCG/ACT inhaler     (I67.1) Aneurysm of left internal carotid artery  Comment: Recommend repeating imaging - she would like to wait until covid improves.      (E78.2) Mixed hyperlipidemia  (Z13.220) Screening cholesterol level  Plan: Lipid panel reflex to direct LDL Fasting     (Z13.1) Screening for diabetes mellitus  Plan: **Comprehensive metabolic panel FUTURE anytime     (K76.0) Fatty liver on ultrasound  Plan: **Comprehensive metabolic panel FUTURE anytime    # Shoulder  - Left shoulder started a few months ago - no trauma, worse in the AM, can barely raise arm over her head  - Right shoulder hurts in the same spot but can still move it.     # Healthy Weight  Wt Readings from Last 4 Encounters:   06/30/21 86.2 kg (190 lb)   09/12/19 79.8 kg (176 lb)   08/09/19 83 kg (183 lb)   07/23/19 85.7 kg (189 lb)   - has a treadmill but  has stuff on it    # History of wheezing  - no longer needing the flovent and used the  albuterol about once this month  - thinks it's more seasonal     # Former smoker  - since age 16 off and on  - started caring for her dad in  - significantly reduced to 1 pack per week     # History of gallstones  - Very rare pain  - Takes a supplement and if she takes this she has no problem.     # HCM    Today's PHQ-2 Score:   PHQ-2 (  [employer]) 2021   Q1: Little interest or pleasure in doing things 0   Q2: Feeling down, depressed or hopeless 0   PHQ-2 Score 0   Q1: Little interest or pleasure in doing things Not at all   Q2: Feeling down, depressed or hopeless Not at all   PHQ-2 Score 0       Abuse: Current or Past (Physical, Sexual or Emotional) - No  Do you feel safe in your environment? Yes    Have you ever done Advance Care Planning? (For example, a Health Directive, POLST, or a discussion with a medical provider or your loved ones about your wishes): No, advance care planning information given to patient to review.  Patient plans to discuss their wishes with loved ones or provider.      Social History     Tobacco Use     Smoking status: Current Every Day Smoker     Types: Other     Start date: 1975     Last attempt to quit: 2016     Years since quittin.8     Smokeless tobacco: Never Used     Tobacco comment: Vape   Substance Use Topics     Alcohol use: Yes     Comment: occa         Alcohol Use 2021   Prescreen: >3 drinks/day or >7 drinks/week? No       Reviewed orders with patient.  Reviewed health maintenance and updated orders accordingly - Yes      Breast Cancer Screening:    Breast CA Risk Assessment (FHS-7) 2021   Do you have a family history of breast, colon, or ovarian cancer? No / Unknown     Pertinent mammograms are reviewed under the imaging tab.    History of abnormal Pap smear: NO - age 30-65 PAP every 5 years with negative HPV co-testing recommended  PAP / HPV Latest Ref Rng & Units 2019   PAP - NIL   HPV 16 DNA NEG:Negative Negative   HPV 18 DNA  "NEG:Negative Negative   OTHER HR HPV NEG:Negative Negative     Reviewed and updated as needed this visit by clinical staff  Tobacco  Allergies    Med Hx  Surg Hx  Fam Hx  Soc Hx        Reviewed and updated as needed this visit by Provider                History reviewed. No pertinent past medical history.   Past Surgical History:   Procedure Laterality Date     TONSILLECTOMY      2nd grade       Review of Systems   Constitutional: Negative for chills and fever.   HENT: Negative for congestion, ear pain, hearing loss and sore throat.    Eyes: Negative for pain and visual disturbance.   Respiratory: Negative for cough and shortness of breath.    Cardiovascular: Negative for chest pain, palpitations and peripheral edema.   Gastrointestinal: Negative for abdominal pain, constipation, diarrhea, heartburn, hematochezia and nausea.   Breasts:  Negative for tenderness, breast mass and discharge.   Genitourinary: Negative for dysuria, frequency, genital sores, hematuria, pelvic pain, urgency, vaginal bleeding and vaginal discharge.   Musculoskeletal: Positive for arthralgias and myalgias. Negative for joint swelling.   Skin: Negative for rash.   Neurological: Negative for dizziness, weakness, headaches and paresthesias.   Psychiatric/Behavioral: Negative for mood changes. The patient is not nervous/anxious.         OBJECTIVE:   /76 (BP Location: Right arm, Patient Position: Sitting, Cuff Size: Adult Large)   Pulse 79   Temp 97  F (36.1  C) (Tympanic)   Resp 14   Ht 1.702 m (5' 7\")   Wt 86.2 kg (190 lb)   LMP  (LMP Unknown)   SpO2 96%   Breastfeeding No   BMI 29.76 kg/m    Physical Exam  GENERAL: healthy, alert and no distress  EYES: Eyes grossly normal to inspection, PERRL and conjunctivae and sclerae normal  HENT: ear canals and TM's normal, nose and mouth without ulcers or lesions  NECK: no adenopathy, no asymmetry, masses, or scars and thyroid normal to palpation  RESP: lungs clear to auscultation - no " rales, rhonchi or wheezes  CV: regular rate and rhythm, normal S1 S2, no S3 or S4, no murmur, click or rub, no peripheral edema and peripheral pulses strong  ABDOMEN: soft, nontender, no hepatosplenomegaly, no masses and bowel sounds normal  MS: shoulders w/out point tenderness. Right shoulder w/ full rom. Left shoulder can raise to 110 degrees, cannot fully get behind her back. Positive coke can sign.   SKIN: no suspicious lesions or rashes  NEURO: Normal strength and tone, mentation intact and speech normal  PSYCH: mentation appears normal, affect normal/bright    Diagnostic Test Results:  Labs pending.    ASSESSMENT/PLAN:   1. Routine general medical examination at a health care facility  - utd pap  - utd colonoscopy  - schedule mammo today  - first shingles vaccine today.     2. Wheezing  Uses inhalers seasonally.   Does have a history of COPD but would not expect this to be seasonal.   - fluticasone (FLOVENT HFA) 44 MCG/ACT inhaler; Inhale 1 puff into the lungs 2 times daily  Dispense: 10.6 g; Refill: 4  - albuterol (PROAIR RESPICLICK) 108 (90 Base) MCG/ACT inhaler; Inhale 2 puffs into the lungs every 4 hours as needed for wheezing  Dispense: 2 each; Refill: 3    3. Mixed hyperlipidemia  - Lipid panel reflex to direct LDL Fasting    4. Chronic left shoulder pain  ? Rotator cuff injury. Will start w/ xrays and physical therapy.  - XR Shoulder Left G/E 3 Views; Future  - [facility]; Future    5. Chronic right shoulder pain  - [facility]; Future    6. Fatty liver on ultrasound  - **Comprehensive metabolic panel FUTURE anytime  - CBC  - will check fib4 score and decide if she needs a fibroscan.     7. Aneurysm of left internal carotid artery  - incidental finding several years ago. See prior notes - unable to get all the records. Would like to reimage in the next year or so - she would like to hold off for now.    8. Biliary calculus  - relatively asymptomatic. Discussed that if she has  "pain she should meet with surgery.     7. Screening cholesterol level  - Lipid panel reflex to direct LDL Fasting    8. Screening for diabetes mellitus  - **Comprehensive metabolic panel FUTURE anytime    9. Encounter for screening mammogram for breast cancer  - MA Screening Digital Bilateral; Future    Patient has been advised of split billing requirements and indicates understanding: No  COUNSELING:  Reviewed preventive health counseling, as reflected in patient instructions       Regular exercise       Healthy diet/nutrition       Immunizations       Colon cancer screening    Estimated body mass index is 29.76 kg/m  as calculated from the following:    Height as of this encounter: 1.702 m (5' 7\").    Weight as of this encounter: 86.2 kg (190 lb).    Weight management plan: Discussed healthy diet and exercise guidelines    She reports that she has been smoking other. She started smoking about 45 years ago. She has never used smokeless tobacco.     Tobacco Cessation Action Plan:   Information offered: Patient not interested at this time    Counseling Resources:  ATP IV Guidelines  Pooled Cohorts Equation Calculator  Breast Cancer Risk Calculator  BRCA-Related Cancer Risk Assessment: FHS-7 Tool  FRAX Risk Assessment  ICSI Preventive Guidelines  Dietary Guidelines for Americans, 2010  USDA's MyPlate  ASA Prophylaxis  Lung CA Screening    Cesar Jean MD  [facility] MICHAEL  "

## 2021-06-30 NOTE — LETTER
"July 1, 2021      Keena Jorge  [address] [address]        Dear ,    It was great to see you in clinic this week. Enclosed are your lab results.     Your cholesterol is high (both the total and the LDL/\"bad cholesterol\") and right on the border of when we would start talking about if a cholesterol medication to help lower your risk of heart attack/stroke makes sense. I know you're working on healthy body movement (getting the shoulder better should help). Let's plan on rechecking this next year.     Your electrolytes, kidney function, and liver enzymes were normal.     Your fasting glucose was elevated - this means you have impaired glucose tolerance, also known as \"pre-diabetes\", and are at a higher risk of developing diabetes. Eating healthily, exercising and maintaining a healthy weight is the best way to help prevent diabetes from developing. We will check your fasting blood sugar/hemoglobin a1c every year to monitor this.     Your blood counts were normal.     Resulted Orders   Lipid panel reflex to direct LDL Fasting   Result Value Ref Range    Cholesterol 252 (H) <200 mg/dL      Comment:      Desirable:       <200 mg/dl    Triglycerides 223 (H) <150 mg/dL      Comment:      Borderline high:  150-199 mg/dl  High:             200-499 mg/dl  Very high:       >499 mg/dl      HDL Cholesterol 47 (L) >49 mg/dL    LDL Cholesterol Calculated 160 (H) <100 mg/dL      Comment:      Above desirable:  100-129 mg/dl  Borderline High:  130-159 mg/dL  High:             160-189 mg/dL  Very high:       >189 mg/dl      Non HDL Cholesterol 205 (H) <130 mg/dL      Comment:      Above Desirable:  130-159 mg/dl  Borderline high:  160-189 mg/dl  High:             190-219 mg/dl  Very high:       >219 mg/dl     **Comprehensive metabolic panel FUTURE anytime   Result Value Ref Range    Sodium 140 133 - 144 mmol/L    Potassium 4.0 3.4 - 5.3 mmol/L    Chloride 108 94 - 109 mmol/L    Carbon Dioxide 26 20 - 32 " mmol/L    Anion Gap 6 3 - 14 mmol/L    Glucose 108 (H) 70 - 99 mg/dL    Urea Nitrogen 9 7 - 30 mg/dL    Creatinine 0.71 0.52 - 1.04 mg/dL    GFR Estimate >90 >60 mL/min/[1.73_m2]      Comment:      Non  GFR Calc  Starting 12/18/2018, serum creatinine based estimated GFR (eGFR) will be   calculated using the Chronic Kidney Disease Epidemiology Collaboration   (CKD-EPI) equation.      GFR Estimate If Black >90 >60 mL/min/[1.73_m2]      Comment:       GFR Calc  Starting 12/18/2018, serum creatinine based estimated GFR (eGFR) will be   calculated using the Chronic Kidney Disease Epidemiology Collaboration   (CKD-EPI) equation.      Calcium 9.0 8.5 - 10.1 mg/dL    Bilirubin Total 0.2 0.2 - 1.3 mg/dL    Albumin 3.6 3.4 - 5.0 g/dL    Protein Total 7.0 6.8 - 8.8 g/dL    Alkaline Phosphatase 99 40 - 150 U/L    ALT 31 0 - 50 U/L    AST 19 0 - 45 U/L   CBC with platelets   Result Value Ref Range    WBC 4.7 4.0 - 11.0 10e9/L    RBC Count 4.74 3.8 - 5.2 10e12/L    Hemoglobin 13.8 11.7 - 15.7 g/dL    Hematocrit 41.8 35.0 - 47.0 %    MCV 88 78 - 100 fl    MCH 29.1 26.5 - 33.0 pg    MCHC 33.0 31.5 - 36.5 g/dL    RDW 12.8 10.0 - 15.0 %    Platelet Count 187 150 - 450 10e9/L       If you have any questions or concerns, please call the clinic at the number listed above.       Sincerely,      Ceasr Jean MD

## 2021-07-01 PROBLEM — R73.01 ELEVATED FASTING BLOOD SUGAR: Status: ACTIVE | Noted: 2021-07-01

## 2021-07-13 ENCOUNTER — THERAPY VISIT (OUTPATIENT)
Dept: PHYSICAL THERAPY | Facility: CLINIC | Age: 61
End: 2021-07-13
Attending: INTERNAL MEDICINE
Payer: COMMERCIAL

## 2021-07-13 DIAGNOSIS — M25.511 CHRONIC RIGHT SHOULDER PAIN: ICD-10-CM

## 2021-07-13 DIAGNOSIS — G89.29 CHRONIC RIGHT SHOULDER PAIN: ICD-10-CM

## 2021-07-13 DIAGNOSIS — M25.512 CHRONIC LEFT SHOULDER PAIN: ICD-10-CM

## 2021-07-13 DIAGNOSIS — G89.29 CHRONIC LEFT SHOULDER PAIN: ICD-10-CM

## 2021-07-13 PROCEDURE — 97161 PT EVAL LOW COMPLEX 20 MIN: CPT | Mod: GP | Performed by: PHYSICAL THERAPIST

## 2021-07-13 PROCEDURE — 97110 THERAPEUTIC EXERCISES: CPT | Mod: GP | Performed by: PHYSICAL THERAPIST

## 2021-07-13 PROCEDURE — 97112 NEUROMUSCULAR REEDUCATION: CPT | Mod: GP | Performed by: PHYSICAL THERAPIST

## 2021-07-13 NOTE — PROGRESS NOTES
[facility] Initial Evaluation -- Upper Extremity    Evaluation Date: July 13, 2021  Keena Jorge is a 60 year old female with a Bilateral shoulder condition.   Referral: Dr. Jean  Work mechanical stresses: -  Employment status:  unemployed  Leisure mechanical stresses: computer   Functional disability score (SPADI): see flowsheet  VAS score (0-10): 5/10  Handedness (R/L):  R  Patient goals/expectations:  Decrease pain    HISTORY    Present symptoms: bilateral shoulder pain    Pain quality (sharp/shooting/stabbing/aching/burning/cramping):  Sharp with movement    Present since (onset date):  May 2021    Symptoms (improving/unchanging/worsening):  unchanging.    Symptoms commenced as a result of: no apparent reason   Condition occurred in the following environment: home    Symptoms at onset: L shoulder pain  Paresthesia (yes/no):  possibly  Spinal history: no   Cough/Sneeze (pos/neg):  neg    Constant symptoms:   Intermittent symptoms: B shoulder pain    Symptoms are worse with the following: Always Dressing, Always Reaching, Always On the move and Always Sleeping (prone/sup/side R/L) - Sides, worse in morning  Symptoms are better with the following: Always When still    Continued use makes the pain (better/worse/no effect): worse    Disturbed night (yes/no):  Yes,     Pain at rest (yes/no): no  Site (neck/shoulder/elbow/wrist/hand): shoulder    Other questions (swelling/catching/clicking/locking/subluxing):  no    Previous episodes: no  Previous treatments: Chiro June 2021, no effect    Specific Questions:  General health (excellent/good/fair/poor):  good  Pertinent medical history includes: Asthma, Menopausal, Overweight and Smoking  Medications (nil/NSAIDS/analg/steroids/anticoag/other):  NSAIDS  Medical allergies:  no  Imaging (None/Xray/MRI/Other): xray  Recent or major surgery (yes/no): no  Night pain (yes/no): no  Accidents (yes/no): no  Unexplained weight loss (yes/no):  no  Barriers at home: no  Other red flags: no    Sites for physical examination (neck/shoulder/elbow/wrist/hand): shoulder, cx spine, thx spine    EXAMINATION    Posture:  Sitting (good/fair/poor): fair  Correction of posture (better/worse/no effect/NA): no effect   Standing (good/fair/poor):   Other observations:      Neurological (NA/motor/sensory/reflexes/dural):     Baselines (pain or functional activity): shoulder Flex/abd    Extremities (Shoulder/Elbow/Wrist/Hand): shoulder    Movement Loss Jamie Mod Min Nil Pain   Flexion  L 105 R 140     Extension        Abduction  R65 L72     Internal Rotation Top of L hip on L R L1      External Rotation   X     Supination        Pronation        Radial Deviation        Ulnar Deviation           Passive Movement (+/- overpressure)/(PDM/ERP):    Resisted Test Response (pain):   Other Tests:     Spine:  Movement Loss: Upper Thx, cx retraction, ext  Effect of repeated movements: rep cx ret/ext w/ op -  incr shoulder ROM  Effect of static positioning:   Spine testing (not relevant/relevant/secondary problem): relevant    Baseline Symptoms:   Repeated Tests Symptom Response Mechanical Response   Active/Passive movement, resisted test, functional test During - Produce, Abolish, Increase, Decrease, NE After -   Better, Worse, NB, NW, NE Effect -   ? or ? ROM, strength or key functional test No Effect   cx ret/ext  No Effect    Better    Incr Shoulder F/Abd/IR+Ext                         Effect of static positioning                  Provisional Classification (Extremity/Spine): Spine - Derangement - Bilateral, symmetrical, symptoms above elbow      Principle of Management:  Education:  Therapeutic dose of exercise, posture  Equipment provided:    Exercise and dosage:      ASSESSMENT/PLAN:    Patient is a 60 year old female with cervical, thoracic and both sides shoulder complaints.    Patient has the following significant findings with corresponding treatment plan.                 Diagnosis 1:  Bilat shoulder pain  Pain -  hot/cold therapy, manual therapy, self management, education, directional preference exercise and home program  Decreased ROM/flexibility - manual therapy and therapeutic exercise  Decreased joint mobility - manual therapy and therapeutic exercise  Decreased strength - therapeutic exercise and therapeutic activities  Impaired muscle performance - neuro re-education  Decreased function - therapeutic activities  Impaired posture - neuro re-education    Therapy Evaluation Codes:   1) History comprised of:   Personal factors that impact the plan of care:      Anxiety and Coping style.    Comorbidity factors that impact the plan of care are:      Asthma, Menopausal, Overweight and Smoking.     Medications impacting care: Anti-inflammatory.  2) Examination of Body Systems comprised of:   Body structures and functions that impact the plan of care:      Cervical spine, Shoulder and Thoracic Spine.   Activity limitations that impact the plan of care are:      Bathing, Dressing, Lifting and Throwing.  3) Clinical presentation characteristics are:   Stable/Uncomplicated.  4) Decision-Making    Low complexity using standardized patient assessment instrument and/or measureable assessment of functional outcome.  Cumulative Therapy Evaluation is: Low complexity.    Previous and current functional limitations:  (See Goal Flow Sheet for this information)    Short term and Long term goals: (See Goal Flow Sheet for this information)     Communication ability:  Patient appears to be able to clearly communicate and understand verbal and written communication and follow directions correctly.  Treatment Explanation - The following has been discussed with the patient:   RX ordered/plan of care  Anticipated outcomes  Possible risks and side effects  This patient would benefit from PT intervention to resume normal activities.   Rehab potential is good.    Frequency:  1 X week, once  daily  Duration:  for 6 weeks  Discharge Plan:  Achieve all LTG.  Independent in home treatment program.  Reach maximal therapeutic benefit.    Please refer to the daily flowsheet for treatment today, total treatment time and time spent performing 1:1 timed codes.

## 2021-07-20 ENCOUNTER — THERAPY VISIT (OUTPATIENT)
Dept: PHYSICAL THERAPY | Facility: CLINIC | Age: 61
End: 2021-07-20
Payer: COMMERCIAL

## 2021-07-20 DIAGNOSIS — G89.29 CHRONIC RIGHT SHOULDER PAIN: ICD-10-CM

## 2021-07-20 DIAGNOSIS — M25.512 CHRONIC LEFT SHOULDER PAIN: Primary | ICD-10-CM

## 2021-07-20 DIAGNOSIS — M25.511 CHRONIC RIGHT SHOULDER PAIN: ICD-10-CM

## 2021-07-20 DIAGNOSIS — G89.29 CHRONIC LEFT SHOULDER PAIN: Primary | ICD-10-CM

## 2021-07-20 PROCEDURE — 97110 THERAPEUTIC EXERCISES: CPT | Mod: GP | Performed by: PHYSICAL THERAPY ASSISTANT

## 2021-07-27 ENCOUNTER — THERAPY VISIT (OUTPATIENT)
Dept: PHYSICAL THERAPY | Facility: CLINIC | Age: 61
End: 2021-07-27
Payer: COMMERCIAL

## 2021-07-27 DIAGNOSIS — G89.29 CHRONIC LEFT SHOULDER PAIN: Primary | ICD-10-CM

## 2021-07-27 DIAGNOSIS — M25.511 CHRONIC RIGHT SHOULDER PAIN: ICD-10-CM

## 2021-07-27 DIAGNOSIS — M25.512 CHRONIC LEFT SHOULDER PAIN: Primary | ICD-10-CM

## 2021-07-27 DIAGNOSIS — G89.29 CHRONIC RIGHT SHOULDER PAIN: ICD-10-CM

## 2021-07-27 PROCEDURE — 97110 THERAPEUTIC EXERCISES: CPT | Mod: GP | Performed by: PHYSICAL THERAPY ASSISTANT

## 2021-07-29 ENCOUNTER — THERAPY VISIT (OUTPATIENT)
Dept: OCCUPATIONAL THERAPY | Facility: CLINIC | Age: 61
End: 2021-07-29
Payer: COMMERCIAL

## 2021-07-29 DIAGNOSIS — M79.644 PAIN OF FINGER OF RIGHT HAND: ICD-10-CM

## 2021-07-29 PROCEDURE — 97165 OT EVAL LOW COMPLEX 30 MIN: CPT | Mod: GO | Performed by: OCCUPATIONAL THERAPIST

## 2021-07-29 PROCEDURE — 97760 ORTHOTIC MGMT&TRAING 1ST ENC: CPT | Mod: GO | Performed by: OCCUPATIONAL THERAPIST

## 2021-07-29 NOTE — PROGRESS NOTES
Hand Therapy Initial Evaluation    Current Date:  7/29/2021    Diagnosis: Finger hyperextension injury  DOI: 7/16/21    Precautions: Mallet - began orthosis use 7/29/21    Subjective:  Keena Jorge is a 60 year old female.    Patient reports symptoms of the right middle finger which occurred due to scrubbing a spot on the carpet. Since onset symptoms are Unchanged  General health as reported by patient is good.  Pertinent medical history includes:Menopausal  Medical allergies:none.  Surgical history: none.  Medication history: None.    Current occupation is none  Job Tasks: Computer Work    Occupational Profile Information:  Right hand dominant  Prior functional level:  no limitations  Patient reports symptoms of pain, stiffness/loss of motion, weakness/loss of strength and edema  Special tests:  none.    Previous treatment: OTC splint  Barriers include:none  Mobility: No difficulty  Transportation: drives  Currently not working   Leisure activities/hobbies: computer time    Functional Outcome Measure:   Upper Extremity Functional Index Score:  SCORE:   Column Totals: /80: 45   (A lower score indicates greater disability.)      Objective:  Pain Level (Scale 0-10):   7/29/2021   At Rest 0/10   With Use 2-3/10     Pain Description:  Date 7/29/2021   Location MF DIP joint   Pain Quality Aching   Frequency intermittent     Pain is worst  daytime   Exacerbated by  bending the finger   Relieved by rest   Progression Unchanged     Edema (Circumference measured in cm)   7/29/2021 7/29/2021   R MF L R   PIP 5.6 5.9   P2 5.1 5.5   DIP 4.8 5.3     Sensation  WNL throughout all nerve distributions; per patient report    ROM  Middle Finger 7/29/2021 7/29/2021   AROM (PROM) L R   MCP /85 0/91   PIP /102 0/94   DIP 0/80 30/85   PACHECO 267 240       Assessment:  Patient presents with symptoms consistent with diagnosis of right long finger mallet,  with non-surgical intervention.     Patient's limitations or Problem List includes:   Pain, Decreased ROM/motion, Increased edema and Weakness of the right long finger which interferes with the patient's ability to perform Self Care Tasks (dressing, eating), Recreational Activities and Household Chores as compared to previous level of function.    Rehab Potential:  Excellent - Return to full activity, no limitations    Patient will benefit from skilled Occupational Therapy to increase ROM and pinch strength and decrease pain and edema to return to previous activity level and resume normal daily tasks and to reach their rehab potential.    Barriers to Learning:  No barrier    Communication Issues:  Patient appears to be able to clearly communicate and understand verbal and written communication and follow directions correctly.    Chart Review: Chart Review, Brief history including review of medical and/or therapy records relating to the presenting problem and Simple history review with patient    Identified Performance Deficits: bathing/showering, dressing, hygiene and grooming, health management and maintenance, home establishment and management, meal preparation and cleanup, shopping and leisure activities    Assessment of Occupational Performance:  3-5 Performance Deficits    Clinical Decision Making (Complexity): Low complexity    Treatment Explanation:  The following has been discussed with the patient:  RX ordered/plan of care  Anticipated outcomes  Possible risks and side effects    Plan:  Frequency:  1 X week, once daily, 1x per week for 2 weeks. Then 1 visit in 6 weeks, followed by every other week as needed. Total up to 6 visits.  Duration:  for 6 weeks    Treatment Plan:   Therapeutic Exercise:  AROM, PROM and Tendon Gliding  Manual Techniques:  Myofascial release and Manual edema mobilization  Orthotic Fabrication:  Static  Discharge Plan:  Achieve all LTG.  Independent in home treatment program.  Reach maximal therapeutic benefit.    Home Exercise Program:  Mallet Finger orthosis, full  time, only removed for hygiene and tip kept straight during that time    Next Visit:  STM to PIP and lateral bands  Remind pt to perform AROM to PIP  Check fit of orthosis

## 2021-08-03 ENCOUNTER — THERAPY VISIT (OUTPATIENT)
Dept: OCCUPATIONAL THERAPY | Facility: CLINIC | Age: 61
End: 2021-08-03
Payer: COMMERCIAL

## 2021-08-03 DIAGNOSIS — M79.644 PAIN OF FINGER OF RIGHT HAND: ICD-10-CM

## 2021-08-03 PROCEDURE — 97140 MANUAL THERAPY 1/> REGIONS: CPT | Mod: GO | Performed by: OCCUPATIONAL THERAPIST

## 2021-08-09 ENCOUNTER — THERAPY VISIT (OUTPATIENT)
Dept: PHYSICAL THERAPY | Facility: CLINIC | Age: 61
End: 2021-08-09
Payer: COMMERCIAL

## 2021-08-09 DIAGNOSIS — G89.29 CHRONIC LEFT SHOULDER PAIN: Primary | ICD-10-CM

## 2021-08-09 DIAGNOSIS — M25.512 CHRONIC LEFT SHOULDER PAIN: Primary | ICD-10-CM

## 2021-08-09 DIAGNOSIS — G89.29 CHRONIC RIGHT SHOULDER PAIN: ICD-10-CM

## 2021-08-09 DIAGNOSIS — M25.511 CHRONIC RIGHT SHOULDER PAIN: ICD-10-CM

## 2021-08-09 PROCEDURE — 97110 THERAPEUTIC EXERCISES: CPT | Mod: GP | Performed by: PHYSICAL THERAPY ASSISTANT

## 2021-08-16 ENCOUNTER — THERAPY VISIT (OUTPATIENT)
Dept: PHYSICAL THERAPY | Facility: CLINIC | Age: 61
End: 2021-08-16
Payer: COMMERCIAL

## 2021-08-16 DIAGNOSIS — M25.512 CHRONIC LEFT SHOULDER PAIN: Primary | ICD-10-CM

## 2021-08-16 DIAGNOSIS — M25.511 CHRONIC RIGHT SHOULDER PAIN: ICD-10-CM

## 2021-08-16 DIAGNOSIS — G89.29 CHRONIC RIGHT SHOULDER PAIN: ICD-10-CM

## 2021-08-16 DIAGNOSIS — G89.29 CHRONIC LEFT SHOULDER PAIN: Primary | ICD-10-CM

## 2021-08-16 PROCEDURE — 97112 NEUROMUSCULAR REEDUCATION: CPT | Mod: GP | Performed by: PHYSICAL THERAPY ASSISTANT

## 2021-08-16 PROCEDURE — 97110 THERAPEUTIC EXERCISES: CPT | Mod: GP | Performed by: PHYSICAL THERAPY ASSISTANT

## 2021-08-23 ENCOUNTER — THERAPY VISIT (OUTPATIENT)
Dept: PHYSICAL THERAPY | Facility: CLINIC | Age: 61
End: 2021-08-23
Payer: COMMERCIAL

## 2021-08-23 DIAGNOSIS — M25.512 CHRONIC LEFT SHOULDER PAIN: Primary | ICD-10-CM

## 2021-08-23 DIAGNOSIS — G89.29 CHRONIC RIGHT SHOULDER PAIN: ICD-10-CM

## 2021-08-23 DIAGNOSIS — G89.29 CHRONIC LEFT SHOULDER PAIN: Primary | ICD-10-CM

## 2021-08-23 DIAGNOSIS — M25.511 CHRONIC RIGHT SHOULDER PAIN: ICD-10-CM

## 2021-08-23 PROCEDURE — 97110 THERAPEUTIC EXERCISES: CPT | Mod: GP | Performed by: PHYSICAL THERAPY ASSISTANT

## 2021-08-24 NOTE — PROGRESS NOTES
Subjective:  HPI  Physical Exam                    Objective:  System    Physical Exam    General     ROS    Assessment/Plan:    DISCHARGE REPORT    Progress reporting period is from 7/13/21 to 8/24/21.      SUBJECTIVE  Subjective changes noted by patient:   Patient reports that she is feeling like she is back to normal.  Has not take OTC med's for the past 3 weeks.   Current pain level is .  Current Pain level: 0/10    Previous pain level was:   Initial Pain level: 5/10   Changes in function:  Yes (See Goal flowsheet attached for changes in current functional level)     Adverse reaction to treatment or activity: None     OBJECTIVE  Changes noted in objective findings:  Yes, AROM R/L DK=671/162, Abd=168/135 ER/Flex= T4/T4, IR/ext= T12/ T11. MMT of the B shoulders patient has a general 5/5.  Patient has a good HEP.  Patient has rounded and forward posture. Discussed with patient to focus on the posture.

## 2021-09-10 ENCOUNTER — THERAPY VISIT (OUTPATIENT)
Dept: OCCUPATIONAL THERAPY | Facility: CLINIC | Age: 61
End: 2021-09-10
Payer: COMMERCIAL

## 2021-09-10 DIAGNOSIS — M79.644 PAIN OF FINGER OF RIGHT HAND: ICD-10-CM

## 2021-09-10 PROCEDURE — 97140 MANUAL THERAPY 1/> REGIONS: CPT | Mod: GO | Performed by: OCCUPATIONAL THERAPIST

## 2021-09-10 PROCEDURE — 97110 THERAPEUTIC EXERCISES: CPT | Mod: GO | Performed by: OCCUPATIONAL THERAPIST

## 2021-09-10 NOTE — PROGRESS NOTES
Hand Therapy Objective note  Current Date:  9/10/21    Diagnosis: Finger hyperextension injury  DOI: 7/16/21    Precautions: Mallet - began orthosis use 7/29/21  6 weeks post - 9/9/21    Objective:  Pain Level (Scale 0-10):   7/29/2021 9/10/21   At Rest 0/10 0/10   With Use 2-3/10 0/10     Pain Description:  Date 7/29/2021   Location MF DIP joint   Pain Quality Aching   Frequency intermittent     Pain is worst  daytime   Exacerbated by  bending the finger   Relieved by rest   Progression Unchanged     Edema (Circumference measured in cm)   7/29/2021 7/29/2021   R MF L R   PIP 5.6 5.9   P2 5.1 5.5   DIP 4.8 5.3     Sensation  WNL throughout all nerve distributions; per patient report    ROM  Middle Finger 7/29/2021 7/29/2021 9/10/21   AROM (PROM) L R R   MCP /85 0/91 /85   PIP /102 0/94 /98   DIP 0/80 30/85 0/35   PACHECO 267 240 218       Home Exercise Program:  Gradually weaning from orthosis  Next week, wear splint ~50% of day  Gentle light composite fisting, or curling fingers into washcloth    Next Visit:  STM to PIP and lateral bands  Check fit of orthosis

## 2021-09-23 ENCOUNTER — THERAPY VISIT (OUTPATIENT)
Dept: OCCUPATIONAL THERAPY | Facility: CLINIC | Age: 61
End: 2021-09-23
Payer: COMMERCIAL

## 2021-09-23 DIAGNOSIS — M79.644 PAIN OF FINGER OF RIGHT HAND: ICD-10-CM

## 2021-09-23 PROCEDURE — 97140 MANUAL THERAPY 1/> REGIONS: CPT | Mod: GO | Performed by: OCCUPATIONAL THERAPIST

## 2021-09-23 PROCEDURE — 97110 THERAPEUTIC EXERCISES: CPT | Mod: GO | Performed by: OCCUPATIONAL THERAPIST

## 2021-09-23 NOTE — PROGRESS NOTES
Hand Therapy Objective note  Current Date:  9/23/21    Diagnosis: Finger hyperextension injury  DOI: 7/16/21    Precautions: Mallet - began orthosis use 7/29/21    8w post    Objective:  Pain Level (Scale 0-10):   7/29/2021 9/10/21   At Rest 0/10 0/10   With Use 2-3/10 0/10     Pain Description:  Date 7/29/2021   Location MF DIP joint   Pain Quality Aching   Frequency intermittent     Pain is worst  daytime   Exacerbated by  bending the finger   Relieved by rest   Progression Unchanged     Edema (Circumference measured in cm)   7/29/2021 7/29/2021 9/23/21   R MF L R R   PIP 5.6 5.9 5.9   P2 5.1 5.5 5.6   DIP 4.8 5.3 5.2     Sensation  WNL throughout all nerve distributions; per patient report    ROM  Middle Finger 7/29/2021 7/29/2021 9/10/21 9/23/21   AROM (PROM) L R R R   MCP /85 0/91 /85 /91   PIP /102 0/94 /98 /95   DIP 0/80 30/85 0/35 0/43   PACHECO 267 240 218 229       Home Exercise Program:  Gradually weaning from orthosis  Next week, wear splint ~50% of day  Gentle light composite fisting, or curling fingers into washcloth  Decrease splint use, increase use of coban    Next Visit:  STM to PIP and lateral bands  Check fit of orthosis

## 2021-10-10 ENCOUNTER — HEALTH MAINTENANCE LETTER (OUTPATIENT)
Age: 61
End: 2021-10-10

## 2021-10-11 DIAGNOSIS — R06.2 WHEEZING: ICD-10-CM

## 2021-10-11 NOTE — TELEPHONE ENCOUNTER
Ph. [phone]    Patient changed pharmacy's and needs these to go to new pharmacy.    Pharmacy t'd up-[facility]    Liberty Berrios .

## 2021-10-11 NOTE — PROGRESS NOTES
SOAP Note - Hand Therapy    Current Date:  10/14/2021    Diagnosis: Finger hyperextension injury  DOI: 7/16/21    Precautions: Mallet - began orthosis use 7/29/21  Post: 11w 0d    O:  Pain Level (Scale 0-10):   7/29/2021 9/10/21 10/14/21   At Rest 0/10 0/10 0/10   With Use 2-3/10 0/10 3/10     Pain Description:  Date 7/29/2021   Location MF DIP joint   Pain Quality Aching   Frequency intermittent     Pain is worst  daytime   Exacerbated by  bending the finger   Relieved by rest   Progression Unchanged     Edema (Circumference measured in cm)   7/29/2021 7/29/2021 9/23/21 10/14/2021   R MF L R R R   PIP 5.6 5.9 5.9 6.2   P2 5.1 5.5 5.6 5.8   DIP 4.8 5.3 5.2 5.4     Sensation  WNL throughout all nerve distributions; per patient report    ROM  Middle Finger 7/29/2021 7/29/2021 9/10/21 9/23/21 10/14/21   AROM (PROM) L R R R R   MCP /85 0/91 /85 /91 /92   PIP /102 0/94 /98 /95 /101   DIP 0/80 30/85 0/35 0/43 7/73   PACHECO 267 240 218 229 259     Please refer to the daily flowsheet for treatment provided today.     Home Exercise Program:  Gradually weaning from orthosis  Next week, wear splint ~50% of day  Gentle light composite fisting, or curling fingers into washcloth  Decrease splint use, increase use of coban    Next Visit:  STM to PIP and lateral bands  Check fit of orthosis    Next Visit:  Check orthosis  Check ROM  STM

## 2021-10-12 RX ORDER — FLUTICASONE PROPIONATE 44 UG/1
1 AEROSOL, METERED RESPIRATORY (INHALATION) 2 TIMES DAILY
Qty: 10.6 G | Refills: 4 | Status: SHIPPED | OUTPATIENT
Start: 2021-10-12 | End: 2023-09-14

## 2021-10-12 RX ORDER — ALBUTEROL SULFATE 90 UG/1
2 POWDER, METERED RESPIRATORY (INHALATION) EVERY 4 HOURS PRN
Qty: 2 EACH | Refills: 3 | Status: SHIPPED | OUTPATIENT
Start: 2021-10-12 | End: 2023-09-14

## 2021-10-13 NOTE — TELEPHONE ENCOUNTER
Prescription approved per [facility] Refill Protocol.  Dhruv Cleveland RN, BSN

## 2021-10-14 ENCOUNTER — THERAPY VISIT (OUTPATIENT)
Dept: OCCUPATIONAL THERAPY | Facility: CLINIC | Age: 61
End: 2021-10-14
Payer: COMMERCIAL

## 2021-10-14 DIAGNOSIS — M79.644 PAIN OF FINGER OF RIGHT HAND: ICD-10-CM

## 2021-10-14 PROCEDURE — 97110 THERAPEUTIC EXERCISES: CPT | Mod: GO | Performed by: OCCUPATIONAL THERAPIST

## 2021-10-14 PROCEDURE — 97763 ORTHC/PROSTC MGMT SBSQ ENC: CPT | Mod: GO | Performed by: OCCUPATIONAL THERAPIST

## 2021-10-28 ENCOUNTER — THERAPY VISIT (OUTPATIENT)
Dept: OCCUPATIONAL THERAPY | Facility: CLINIC | Age: 61
End: 2021-10-28
Payer: COMMERCIAL

## 2021-10-28 DIAGNOSIS — M79.644 PAIN OF FINGER OF RIGHT HAND: ICD-10-CM

## 2021-10-28 PROCEDURE — 97140 MANUAL THERAPY 1/> REGIONS: CPT | Mod: GO | Performed by: OCCUPATIONAL THERAPIST

## 2021-10-28 PROCEDURE — 97110 THERAPEUTIC EXERCISES: CPT | Mod: GO | Performed by: OCCUPATIONAL THERAPIST

## 2021-10-28 NOTE — PROGRESS NOTES
Orthopaedic Surgery Hand and Upper Extremity Clinic H&P NOTE:  10/28/2021     Patient Name: Keena Jorge  MRN: [id number]    CHIEF COMPLAINT: right middle finger pain    Dominant Hand:right  Occupation: video game streamer       HPI:  Ms. Keena Jorge is a 61 year old female right hand dominant who presents with right middle finger pain states that it started in August 2021 when she was scrubbing the floor and her DIP joint bent into flexion. Swelling, inability to extend DIP joint, redness, throbbing pain depending on ADLs, no pain when at rest or while wearing the splint. Denies numbness or tingling. Treated by Hand Therapy for mallet finger. Wore splint 24/7 for 6 weeks, after which she experienced recurrence in extensor lag. Has been seeing hand therapy again, and has now been wearing it at all times except for hygiene for 2 weeks. I was told that she was having pain over her middle phalanx, which she denies today, but does think that PIP joint is stiff. Tip of finger remains hyperemic and swollen, which she states has been like that since she's been wearing the splint    What makes it better or worse: nothing  Treatment: PT, splint, ice.   Previous Imaging: none  Hobbies/activities: plays computer video games, crochets    The patient denies pain elsewhere in the symptomatic upper extremity.        PAST MEDICAL HISTORY:  No past medical history on file.    PAST SURGICAL HISTORY:  Past Surgical History:   Procedure Laterality Date     TONSILLECTOMY      2nd grade       MEDICATIONS:  Current Outpatient Medications   Medication     albuterol (PROAIR RESPICLICK) 108 (90 Base) MCG/ACT inhaler     fluticasone (FLOVENT HFA) 44 MCG/ACT inhaler     No current facility-administered medications for this visit.       ALLERGIES:   No Known Allergies    FAMILY HISTORY:  No pertinent family history    SOCIAL HISTORY:  Social History     Tobacco Use     Smoking status: Current Every Day Smoker     Types: Other     Start date:  1975     Last attempt to quit: 2016     Years since quittin.1     Smokeless tobacco: Never Used     Tobacco comment: Vape   Substance Use Topics     Alcohol use: Yes     Comment: occa     Drug use: No             The patient's past medical, family, and social history was reviewed and confirmed.    REVIEW OF SYMPTOMS:      General: Negative   Eyes: Negative   Ear, Nose and Throat: Negative   Respiratory: Negative   Cardiovascular: Negative   Gastrointestinal: Negative   Genito-urinary: Negative   Musculoskeletal: Negative  Neurological: Negative   Psychological: Negative  HEME: Negative   ENDO: Negative   SKIN: Negative    VITALS:  There were no vitals filed for this visit.    EXAM:  General: NAD, A&Ox3  HEENT: NC/AT  CV: RRR by peripheral pulse  Pulmonary: Non-labored breathing on RA  MSK:  Focused exam R MF shows mild redness and swelling mid-middle phalanx and distal.  Hussain test negative, intact central slip  Mild TTP dorsal distal finger DIP  Able to actively flex and extend DIP. Full DIP extension and EDC function with 0 lag.. No swan neck deformity.   No tenderness of middle phalanx or central slip insertion  Intact FDP/FDS/EDC  SILT m/r/u  WWP CR< 2s    IMAGING:  R hand XRs from today well-appearing; minimal to no degenerative changes, no frx/dislocation, soft-tissues within normal limits. No evidence of bony mallet    I have personally reviewed the above images and labs.       IMPRESSION AND RECOMMENDATIONS:  Ms. Keena Jorge is a 61 year old year old female right hand dominant with R MF mallet finger. Treated initially successfully with extension splinting with resolution of extensor lag. Lag recurred, and now 2 weeks into second course of immobilization.    - Recommend another 4 weeks of mallet finger immobilization to complete second course of 6 weeks of immobilization. Encourage PIP motion to minimize stiffness.  - Wean immobilization thereafter  - Tylenol/ibuprofen for pain  - RTC PRN if  still having discomfort after return to activity  - if developing pain over central slip insertion, PIP joint hyperextension or laxity, may be candidate for central slip tenotomy in the future    Patient voiced understanding and agreement, all questions answered to her satisfaction.    Jony Duarte MD    Hand, Upper Extremity & Microvascular Surgery  Department of Orthopaedic Surgery  [facility]

## 2021-10-28 NOTE — PROGRESS NOTES
Progress Note - Hand Therapy  Reporting period is 7/29/21 to 10/28/21.    Current Date:  10/28/2021  Subjectve:   Subjective changes as noted by patient:  It's sore from being straight, but swelling seems to have gone down.  Functional changes noted by patient:  No Change to Self Care Tasks (dressing, eating, bathing), Work Tasks, Sleep Patterns, Recreational Activities, Household Chores and Driving   Patient has noted adverse reaction to:  None    Objective:  Changes noted in objective findings: Yes, see below    Diagnosis: Finger hyperextension injury  DOI: 7/16/21    Precautions: Mallet - began orthosis use 7/29/21  Post: 12w 6d     O:  Pain Level (Scale 0-10):   7/29/2021 9/10/21 10/14/21 10/28/21   At Rest 0/10 0/10 0/10 0/10   With Use 2-3/10 0/10 3/10 1/10     Pain Description:  Date 7/29/2021 10/28/21   Location MF DIP joint MF p2   Pain Quality Aching Aching    Frequency intermittent   intermittent   Pain is worst  daytime daytime   Exacerbated by  bending the finger Bending finger   Relieved by rest rest   Progression Unchanged Possibly slightly improving     Edema (Circumference measured in cm)   7/29/2021 7/29/2021 9/23/21 10/14/2021 10/28/21   R MF L R R R R   PIP 5.6 5.9 5.9 6.2 5.9   P2 5.1 5.5 5.6 5.8 5.7   DIP 4.8 5.3 5.2 5.4 5.2     Sensation  WNL throughout all nerve distributions; per patient report    ROM  Middle Finger 7/29/2021 7/29/2021 9/10/21 9/23/21 10/14/21 10/28/21   AROM (PROM) L R R R R R   MCP /85 0/91 /85 /91 /92 0/88   PIP /102 0/94 /98 /95 /101 0/86   DIP 0/80 30/85 0/35 0/43 7/73 0/29   PACHECO 267 240 218 229 259 203     Assessment:  Response to therapy has been improvement to:  ROM of Fingers: DIP joint - Ext  Edema:  Swelling has lessened  Pain:  intensity of pain is decreased  Response to therapy has been lack of progress in:  ROM of Fingers: MP joint - Flex, PIP joint - Flex, DIP joint - Flex    Overall Assessment:  Patient is ready to progress to more complex exercises.  Patient  is ready to progress to next level of protocol.  Patient would benefit from continued therapy to achieve rehab potential  STG/LTG:  STGoals have been reviewed;  see goal sheet for details and updates.  LTGoals have been reviewed;  see goal sheet for details and updates.    I have re-evaluated this patient and find that the nature, scope, duration and intensity of the therapy is appropriate for the medical condition of the patient.    Plan:  Frequency:  1 X week, once daily  Duration:  for 6 weeks    Home Exercise Program:  Gradually weaning from orthosis  Gentle light composite fisting, or curling fingers into washcloth  Decrease splint use, increase use of coban  Still using orthosis for sleeping and heavy activity    Next Visit:  STM to PIP and lateral bands  Check fit of orthosis

## 2021-10-29 ENCOUNTER — OFFICE VISIT (OUTPATIENT)
Dept: ORTHOPEDICS | Facility: CLINIC | Age: 61
End: 2021-10-29
Payer: COMMERCIAL

## 2021-10-29 ENCOUNTER — ANCILLARY PROCEDURE (OUTPATIENT)
Dept: GENERAL RADIOLOGY | Facility: CLINIC | Age: 61
End: 2021-10-29
Attending: STUDENT IN AN ORGANIZED HEALTH CARE EDUCATION/TRAINING PROGRAM
Payer: COMMERCIAL

## 2021-10-29 VITALS
BODY MASS INDEX: 30.53 KG/M2 | HEIGHT: 66 IN | WEIGHT: 190 LBS | DIASTOLIC BLOOD PRESSURE: 90 MMHG | SYSTOLIC BLOOD PRESSURE: 132 MMHG

## 2021-10-29 DIAGNOSIS — M79.644 PAIN OF RIGHT MIDDLE FINGER: Primary | ICD-10-CM

## 2021-10-29 DIAGNOSIS — M79.644 PAIN OF RIGHT MIDDLE FINGER: ICD-10-CM

## 2021-10-29 PROCEDURE — 73130 X-RAY EXAM OF HAND: CPT | Mod: RT | Performed by: RADIOLOGY

## 2021-10-29 PROCEDURE — 99203 OFFICE O/P NEW LOW 30 MIN: CPT | Performed by: STUDENT IN AN ORGANIZED HEALTH CARE EDUCATION/TRAINING PROGRAM

## 2021-10-29 ASSESSMENT — MIFFLIN-ST. JEOR: SCORE: 1448.58

## 2021-10-29 NOTE — LETTER
10/29/2021         RE: Keena Jorge  [address] [facility] [address]        Dear Colleague,    Thank you for referring your patient, Keena Jorge, to the [facility] [place]. Please see a copy of my visit note below.    Orthopaedic Surgery Hand and Upper Extremity Clinic H&P NOTE:  10/28/2021     Patient Name: Keena Jorge  MRN: [id number]    CHIEF COMPLAINT: right middle finger pain    Dominant Hand:right  Occupation: video game streamer       HPI:  Ms. Keena Jorge is a 61 year old female right hand dominant who presents with right middle finger pain states that it started in August 2021 when she was scrubbing the floor and her DIP joint bent into flexion. Swelling, inability to extend DIP joint, redness, throbbing pain depending on ADLs, no pain when at rest or while wearing the splint. Denies numbness or tingling. Treated by Hand Therapy for mallet finger. Wore splint 24/7 for 6 weeks, after which she experienced recurrence in extensor lag. Has been seeing hand therapy again, and has now been wearing it at all times except for hygiene for 2 weeks. I was told that she was having pain over her middle phalanx, which she denies today, but does think that PIP joint is stiff. Tip of finger remains hyperemic and swollen, which she states has been like that since she's been wearing the splint    What makes it better or worse: nothing  Treatment: PT, splint, ice.   Previous Imaging: none  Hobbies/activities: plays computer video games, crochets    The patient denies pain elsewhere in the symptomatic upper extremity.        PAST MEDICAL HISTORY:  No past medical history on file.    PAST SURGICAL HISTORY:  Past Surgical History:   Procedure Laterality Date     TONSILLECTOMY      2nd grade       MEDICATIONS:  Current Outpatient Medications   Medication     albuterol (PROAIR RESPICLICK) 108 (90 Base) MCG/ACT inhaler     fluticasone (FLOVENT HFA) 44 MCG/ACT inhaler     No current  facility-administered medications for this visit.       ALLERGIES:   No Known Allergies    FAMILY HISTORY:  No pertinent family history    SOCIAL HISTORY:  Social History     Tobacco Use     Smoking status: Current Every Day Smoker     Types: Other     Start date: 1975     Last attempt to quit: 2016     Years since quittin.1     Smokeless tobacco: Never Used     Tobacco comment: Vape   Substance Use Topics     Alcohol use: Yes     Comment: occa     Drug use: No             The patient's past medical, family, and social history was reviewed and confirmed.    REVIEW OF SYMPTOMS:      General: Negative   Eyes: Negative   Ear, Nose and Throat: Negative   Respiratory: Negative   Cardiovascular: Negative   Gastrointestinal: Negative   Genito-urinary: Negative   Musculoskeletal: Negative  Neurological: Negative   Psychological: Negative  HEME: Negative   ENDO: Negative   SKIN: Negative    VITALS:  There were no vitals filed for this visit.    EXAM:  General: NAD, A&Ox3  HEENT: NC/AT  CV: RRR by peripheral pulse  Pulmonary: Non-labored breathing on RA  MSK:  Focused exam R MF shows mild redness and swelling mid-middle phalanx and distal.  Hussain test negative, intact central slip  Mild TTP dorsal distal finger DIP  Able to actively flex and extend DIP. Full DIP extension and EDC function with 0 lag.. No swan neck deformity.   No tenderness of middle phalanx or central slip insertion  Intact FDP/FDS/EDC  SILT m/r/u  WWP CR< 2s    IMAGING:  R hand XRs from today well-appearing; minimal to no degenerative changes, no frx/dislocation, soft-tissues within normal limits. No evidence of bony mallet    I have personally reviewed the above images and labs.       IMPRESSION AND RECOMMENDATIONS:  Ms. Keena Jorge is a 61 year old year old female right hand dominant with R MF mallet finger. Treated initially successfully with extension splinting with resolution of extensor lag. Lag recurred, and now 2 weeks into second  course of immobilization.    - Recommend another 4 weeks of mallet finger immobilization to complete second course of 6 weeks of immobilization. Encourage PIP motion to minimize stiffness.  - Wean immobilization thereafter  - Tylenol/ibuprofen for pain  - RTC PRN if still having discomfort after return to activity  - if developing pain over central slip insertion, PIP joint hyperextension or laxity, may be candidate for central slip tenotomy in the future    Patient voiced understanding and agreement, all questions answered to her satisfaction.    Jony Duarte MD    Hand, Upper Extremity & Microvascular Surgery  Department of Orthopaedic Surgery  [facility]          Again, thank you for allowing me to participate in the care of your patient.        Sincerely,        Jony Duarte MD

## 2021-11-04 ENCOUNTER — THERAPY VISIT (OUTPATIENT)
Dept: OCCUPATIONAL THERAPY | Facility: CLINIC | Age: 61
End: 2021-11-04
Payer: COMMERCIAL

## 2021-11-04 DIAGNOSIS — M79.644 PAIN OF FINGER OF RIGHT HAND: ICD-10-CM

## 2021-11-04 PROCEDURE — 97763 ORTHC/PROSTC MGMT SBSQ ENC: CPT | Mod: GO | Performed by: OCCUPATIONAL THERAPIST

## 2021-11-04 NOTE — PROGRESS NOTES
Subjective:  HPI  Physical Exam                    Objective:  System    Physical Exam    General     ROS    Assessment/Plan:    ASSESSMENT/PLAN  Updated problem list and treatment plan: Diagnosis 1:  Bilateral shoulder pain2  Pain -  manual therapy, self management, education, directional preference exercise and home program  Decreased ROM/flexibility - manual therapy and therapeutic exercise  Decreased joint mobility - manual therapy and therapeutic exercise  Decreased strength - therapeutic exercise and therapeutic activities  Decreased function - therapeutic activities  Impaired posture - neuro re-education  STG/LTGs have been met:  Yes (See Goal flow sheet completed today.)  Progress toward STG/LTGs have been made:  Yes (See Goal flow sheet completed today.)  Assessment of Progress: The patient has met all of their long term goals.  Self Management Plans:  Patient is independent in a home treatment program.  Patient is independent in self management of symptoms.  I have re-evaluated this patient and find that the nature, scope, duration and intensity of the therapy is appropriate for the medical condition of the patient.  Keena continues to require the following intervention to meet STG and LT's:  PT intervention is no longer required to meet STG/LTG.    Recommendations:  This patient is ready to be discharged from therapy and continue their home treatment program.    Please refer to the daily flowsheet for treatment today, total treatment time and time spent performing 1:1 timed codes.

## 2021-11-30 ENCOUNTER — THERAPY VISIT (OUTPATIENT)
Dept: OCCUPATIONAL THERAPY | Facility: CLINIC | Age: 61
End: 2021-11-30
Payer: COMMERCIAL

## 2021-11-30 DIAGNOSIS — M79.644 PAIN OF FINGER OF RIGHT HAND: ICD-10-CM

## 2021-11-30 PROCEDURE — 97140 MANUAL THERAPY 1/> REGIONS: CPT | Mod: GO | Performed by: OCCUPATIONAL THERAPIST

## 2021-11-30 PROCEDURE — 97110 THERAPEUTIC EXERCISES: CPT | Mod: GO | Performed by: OCCUPATIONAL THERAPIST

## 2021-11-30 NOTE — PROGRESS NOTES
Discharge Note - Hand Therapy    Current Date:  11/30/2021    Reporting period is from 7/29/21 to 11/30/2021    Subjective:   Subjective changes as noted by patient:  I think it's okay. I haven't worn the splint in a week or so.      Functional changes noted by patient:  Improvement in Self Care Tasks (dressing, eating, bathing), Recreational Activities and Household Chores  Patient has noted adverse reaction to:  None        Objective:  See Objective measurements in Physical Exam  Current Date:  10/28/2021    Pain Level (Scale 0-10):   7/29/2021 9/10/21 10/14/21 10/28/21   At Rest 0/10 0/10 0/10 0/10   With Use 2-3/10 0/10 3/10 1/10     Pain Description:  Date 7/29/2021 10/28/21   Location MF DIP joint MF p2   Pain Quality Aching Aching    Frequency intermittent   intermittent   Pain is worst  daytime daytime   Exacerbated by  bending the finger Bending finger   Relieved by rest rest   Progression Unchanged Possibly slightly improving     Edema (Circumference measured in cm)   7/29/2021 7/29/2021 9/23/21 10/14/2021 10/28/21   R MF L R R R R   PIP 5.6 5.9 5.9 6.2 5.9   P2 5.1 5.5 5.6 5.8 5.7   DIP 4.8 5.3 5.2 5.4 5.2     Sensation  WNL throughout all nerve distributions; per patient report    ROM  Middle Finger 7/29/2021 7/29/2021 9/10/21 9/23/21 10/14/21 10/28/21 11/30/21   AROM (PROM) L R R R R R R   MCP /85 0/91 /85 /91 /92 0/88 0/92   PIP /102 0/94 /98 /95 /101 0/86 0/101   DIP 0/80 30/85 0/35 0/43 7/73 0/29 0/55   PACHECO 267 240 218 229 259 203 248     Assessment:  Response to therapy has been improvement to:  ROM of Fingers: All Planes  Pain:  frequency is less, intensity of pain is decreased and less tender over affected area  Appropriateness of Rx I have re-evaluated this patient and find that the nature, scope, duration and intensity of the therapy is appropriate for the medical condition of the patient.  Overall Assessment:  Patient's symptoms are resolving.  Patient is progressing well.  Patient is  independent in home exercise program.  Patient is ready to be discharged from therapy and continue their home treatment program.  STG/LTG:  See goal sheet for details and updates.    Plan:  Frequency/Duration:  Discharge from Hand Therapy; continue home program.    Recommendations for Home Program - Therapeutic Exercise:  AROM and Tendon Gliding  Manual Techniques:  Myofascial release     Home Exercise Program:  Gradually weaning from orthosis  Gentle light composite fisting, or curling fingers into washcloth  Decrease splint use, increase use of coban  Still using orthosis for sleeping and heavy activity

## 2022-01-18 PROBLEM — M79.644 PAIN OF FINGER OF RIGHT HAND: Status: RESOLVED | Noted: 2021-07-29 | Resolved: 2022-01-18

## 2022-09-18 ENCOUNTER — HEALTH MAINTENANCE LETTER (OUTPATIENT)
Age: 62
End: 2022-09-18

## 2022-12-21 ENCOUNTER — APPOINTMENT (OUTPATIENT)
Dept: CT IMAGING | Facility: CLINIC | Age: 62
End: 2022-12-21
Attending: EMERGENCY MEDICINE
Payer: COMMERCIAL

## 2022-12-21 ENCOUNTER — APPOINTMENT (OUTPATIENT)
Dept: ULTRASOUND IMAGING | Facility: CLINIC | Age: 62
End: 2022-12-21
Attending: EMERGENCY MEDICINE
Payer: COMMERCIAL

## 2022-12-21 ENCOUNTER — HOSPITAL ENCOUNTER (EMERGENCY)
Facility: CLINIC | Age: 62
Discharge: HOME | End: 2022-12-21
Attending: EMERGENCY MEDICINE | Admitting: EMERGENCY MEDICINE
Payer: COMMERCIAL

## 2022-12-21 VITALS
TEMPERATURE: 98.7 F | WEIGHT: 207.45 LBS | HEART RATE: 81 BPM | RESPIRATION RATE: 20 BRPM | DIASTOLIC BLOOD PRESSURE: 92 MMHG | OXYGEN SATURATION: 94 % | SYSTOLIC BLOOD PRESSURE: 168 MMHG | BODY MASS INDEX: 33.48 KG/M2

## 2022-12-21 DIAGNOSIS — K80.50 BILIARY COLIC: ICD-10-CM

## 2022-12-21 LAB
ALBUMIN SERPL BCG-MCNC: 4.4 G/DL (ref 3.5–5.2)
ALP SERPL-CCNC: 109 U/L (ref 35–104)
ALT SERPL W P-5'-P-CCNC: 30 U/L (ref 10–35)
ANION GAP SERPL CALCULATED.3IONS-SCNC: 13 MMOL/L (ref 7–15)
AST SERPL W P-5'-P-CCNC: 26 U/L (ref 10–35)
BASOPHILS # BLD AUTO: 0 10E3/UL (ref 0–0.2)
BASOPHILS NFR BLD AUTO: 0 %
BILIRUB SERPL-MCNC: 0.2 MG/DL
BUN SERPL-MCNC: 7.5 MG/DL (ref 8–23)
CALCIUM SERPL-MCNC: 9.3 MG/DL (ref 8.8–10.2)
CHLORIDE SERPL-SCNC: 100 MMOL/L (ref 98–107)
CREAT SERPL-MCNC: 0.74 MG/DL (ref 0.51–0.95)
D DIMER PPP FEU-MCNC: 0.32 UG/ML FEU (ref 0–0.5)
DEPRECATED HCO3 PLAS-SCNC: 25 MMOL/L (ref 22–29)
EOSINOPHIL # BLD AUTO: 0.2 10E3/UL (ref 0–0.7)
EOSINOPHIL NFR BLD AUTO: 3 %
ERYTHROCYTE [DISTWIDTH] IN BLOOD BY AUTOMATED COUNT: 12.4 % (ref 10–15)
GFR SERPL CREATININE-BSD FRML MDRD: >90 ML/MIN/1.73M2
GLUCOSE SERPL-MCNC: 122 MG/DL (ref 70–99)
HCT VFR BLD AUTO: 45.1 % (ref 35–47)
HGB BLD-MCNC: 14.4 G/DL (ref 11.7–15.7)
HOLD SPECIMEN: NORMAL
HOLD SPECIMEN: NORMAL
IMM GRANULOCYTES # BLD: 0 10E3/UL
IMM GRANULOCYTES NFR BLD: 0 %
LIPASE SERPL-CCNC: 21 U/L (ref 13–60)
LYMPHOCYTES # BLD AUTO: 1.3 10E3/UL (ref 0.8–5.3)
LYMPHOCYTES NFR BLD AUTO: 19 %
MCH RBC QN AUTO: 28.5 PG (ref 26.5–33)
MCHC RBC AUTO-ENTMCNC: 31.9 G/DL (ref 31.5–36.5)
MCV RBC AUTO: 89 FL (ref 78–100)
MONOCYTES # BLD AUTO: 0.5 10E3/UL (ref 0–1.3)
MONOCYTES NFR BLD AUTO: 8 %
NEUTROPHILS # BLD AUTO: 4.5 10E3/UL (ref 1.6–8.3)
NEUTROPHILS NFR BLD AUTO: 70 %
NRBC # BLD AUTO: 0 10E3/UL
NRBC BLD AUTO-RTO: 0 /100
PLATELET # BLD AUTO: 224 10E3/UL (ref 150–450)
POTASSIUM SERPL-SCNC: 4.2 MMOL/L (ref 3.4–5.3)
PROT SERPL-MCNC: 7 G/DL (ref 6.4–8.3)
RBC # BLD AUTO: 5.06 10E6/UL (ref 3.8–5.2)
SODIUM SERPL-SCNC: 138 MMOL/L (ref 136–145)
TROPONIN T SERPL HS-MCNC: <6 NG/L
WBC # BLD AUTO: 6.5 10E3/UL (ref 4–11)

## 2022-12-21 PROCEDURE — 96375 TX/PRO/DX INJ NEW DRUG ADDON: CPT

## 2022-12-21 PROCEDURE — 85025 COMPLETE CBC W/AUTO DIFF WBC: CPT | Performed by: EMERGENCY MEDICINE

## 2022-12-21 PROCEDURE — 36415 COLL VENOUS BLD VENIPUNCTURE: CPT | Performed by: EMERGENCY MEDICINE

## 2022-12-21 PROCEDURE — 250N000011 HC RX IP 250 OP 636: Performed by: EMERGENCY MEDICINE

## 2022-12-21 PROCEDURE — 85379 FIBRIN DEGRADATION QUANT: CPT | Performed by: EMERGENCY MEDICINE

## 2022-12-21 PROCEDURE — 258N000003 HC RX IP 258 OP 636: Performed by: EMERGENCY MEDICINE

## 2022-12-21 PROCEDURE — 84484 ASSAY OF TROPONIN QUANT: CPT | Performed by: EMERGENCY MEDICINE

## 2022-12-21 PROCEDURE — 96361 HYDRATE IV INFUSION ADD-ON: CPT

## 2022-12-21 PROCEDURE — 80053 COMPREHEN METABOLIC PANEL: CPT | Performed by: EMERGENCY MEDICINE

## 2022-12-21 PROCEDURE — 99285 EMERGENCY DEPT VISIT HI MDM: CPT | Mod: 25

## 2022-12-21 PROCEDURE — 83690 ASSAY OF LIPASE: CPT | Performed by: EMERGENCY MEDICINE

## 2022-12-21 PROCEDURE — 96374 THER/PROPH/DIAG INJ IV PUSH: CPT | Mod: 59

## 2022-12-21 PROCEDURE — 74177 CT ABD & PELVIS W/CONTRAST: CPT

## 2022-12-21 PROCEDURE — 76705 ECHO EXAM OF ABDOMEN: CPT

## 2022-12-21 RX ORDER — KETOROLAC TROMETHAMINE 15 MG/ML
15 INJECTION, SOLUTION INTRAMUSCULAR; INTRAVENOUS ONCE
Status: COMPLETED | OUTPATIENT
Start: 2022-12-21 | End: 2022-12-21

## 2022-12-21 RX ORDER — ONDANSETRON 4 MG/1
4 TABLET, ORALLY DISINTEGRATING ORAL ONCE
Status: DISCONTINUED | OUTPATIENT
Start: 2022-12-21 | End: 2022-12-21

## 2022-12-21 RX ORDER — OXYCODONE HYDROCHLORIDE 5 MG/1
5 TABLET ORAL ONCE
Status: DISCONTINUED | OUTPATIENT
Start: 2022-12-21 | End: 2022-12-21

## 2022-12-21 RX ORDER — ONDANSETRON 2 MG/ML
4 INJECTION INTRAMUSCULAR; INTRAVENOUS ONCE
Status: COMPLETED | OUTPATIENT
Start: 2022-12-21 | End: 2022-12-21

## 2022-12-21 RX ORDER — IOPAMIDOL 755 MG/ML
500 INJECTION, SOLUTION INTRAVASCULAR ONCE
Status: COMPLETED | OUTPATIENT
Start: 2022-12-21 | End: 2022-12-21

## 2022-12-21 RX ORDER — OXYCODONE AND ACETAMINOPHEN 5; 325 MG/1; MG/1
1 TABLET ORAL EVERY 4 HOURS PRN
Qty: 12 TABLET | Refills: 0 | Status: SHIPPED | OUTPATIENT
Start: 2022-12-21 | End: 2022-12-23

## 2022-12-21 RX ORDER — ONDANSETRON 4 MG/1
4 TABLET, ORALLY DISINTEGRATING ORAL EVERY 8 HOURS PRN
Qty: 10 TABLET | Refills: 0 | Status: SHIPPED | OUTPATIENT
Start: 2022-12-21 | End: 2023-11-16

## 2022-12-21 RX ADMIN — IOPAMIDOL 100 ML: 755 INJECTION, SOLUTION INTRAVENOUS at 09:53

## 2022-12-21 RX ADMIN — SODIUM CHLORIDE 1000 ML: 9 INJECTION, SOLUTION INTRAVENOUS at 08:18

## 2022-12-21 RX ADMIN — SODIUM CHLORIDE 65 ML: 9 INJECTION, SOLUTION INTRAVENOUS at 09:53

## 2022-12-21 RX ADMIN — ONDANSETRON 4 MG: 2 INJECTION INTRAMUSCULAR; INTRAVENOUS at 08:22

## 2022-12-21 RX ADMIN — KETOROLAC TROMETHAMINE 15 MG: 15 INJECTION, SOLUTION INTRAMUSCULAR; INTRAVENOUS at 08:23

## 2022-12-21 ASSESSMENT — ACTIVITIES OF DAILY LIVING (ADL)
ADLS_ACUITY_SCORE: 35
ADLS_ACUITY_SCORE: 35

## 2022-12-21 NOTE — ED PROVIDER NOTES
History     Chief Complaint:  Abdominal Pain       HPI   Keena Jorge is a 62 year old female who presents with right upper quadrant abdominal pain.  Symptoms feel similar to gallbladder attack a few years ago.  Symptoms onset a few hours after eating last night.  They have been constant since onset.  Associated nausea with 1 episode of vomiting.  Pain is worse with deep breathing.  No shortness of breath.  No history of DVT/PE.  No fever or cough or diarrhea or constipation or dysuria or hematuria.  Last p.o. intake was last night.  No blood thinner use.    ROS:  Review of Systems  A 10 point ROS was obtained and negative except as noted here and in HPI      Allergies:  No Known Allergies     Medications:    albuterol (PROAIR RESPICLICK) 108 (90 Base) MCG/ACT inhaler  fluticasone (FLOVENT HFA) 44 MCG/ACT inhaler  ondansetron (ZOFRAN ODT) 4 MG ODT tab  oxyCODONE-acetaminophen (PERCOCET) 5-325 MG tablet        Past Medical History:    History reviewed. No pertinent past medical history.    Past Surgical History:    Past Surgical History:   Procedure Laterality Date     TONSILLECTOMY      2nd grade        Family History:    family history includes Chronic Obstructive Pulmonary Disease in her father; Diabetes (age of onset: 50) in her brother; Diabetes (age of onset: 75) in her maternal grandmother; Lung Cancer in her paternal grandfather; Lymphoma in her brother; Myocardial Infarction (age of onset: 72) in her mother; Thyroid Disease in her mother, sister, sister, and sister.    Social History:   reports that she has been smoking other. She started smoking about 47 years ago. She has never used smokeless tobacco. She reports current alcohol use. She reports that she does not use drugs.  PCP: Cesar Jean     Physical Exam     Patient Vitals for the past 24 hrs:   BP Temp Temp src Pulse Resp SpO2 Weight   12/21/22 1132 (!) 168/92 -- -- 81 -- 94 % --   12/21/22 1117 (!) 151/81 -- -- 78 -- 96 % --    12/21/22 1102 (!) 140/94 -- -- 80 -- 93 % --   12/21/22 1100 -- -- -- -- -- 93 % --   12/21/22 1045 (!) 159/90 -- -- 82 -- 90 % --   12/21/22 1030 (!) 142/84 -- -- 77 -- 92 % --   12/21/22 1015 (!) 149/84 -- -- 76 -- 92 % --   12/21/22 1005 -- -- -- 81 -- 95 % --   12/21/22 1000 (!) 148/85 -- -- -- -- -- --   12/21/22 0822 (!) 214/113 -- -- 77 -- 96 % --   12/21/22 0821 (!) 202/111 -- -- 83 -- 97 % --   12/21/22 0623 (!) 212/116 98.7  F (37.1  C) Temporal -- 20 97 % 94.1 kg (207 lb 7.3 oz)        Physical Exam  VS: Reviewed per above  HENT: normal speech  EYES: sclera anicteric  CV: Rate as noted, regular rhythm.   RESP: Effort normal. Breath sounds are normal bilaterally.  GI: mild RUQ ttp without rebound/guarding, not distended.  NEURO: Alert, moving all extremities  MSK: No deformity of the extremities  SKIN: Warm and dry    Emergency Department Course     Imaging:  CT Abdomen Pelvis w Contrast   Final Result   IMPRESSION:    1. No evidence of acute pathology in the abdomen or pelvis.   2. Significant hepatic steatosis.   3. Cholelithiasis without CT evidence of acute cholecystitis.      JALEN LOU MD            SYSTEM ID:  [id number]      Abdomen US, limited (RUQ only)   Preliminary Result   IMPRESSION:   1.  Probable adenomyomatosis of the gallbladder. The gallbladder is   otherwise normal in appearance without cholelithiasis or findings of   acute cholecystitis.   2.  Diffuse hepatic steatosis with focal areas of fatty sparing,   similar to the prior study.   3.  Otherwise negative limited abdominal ultrasound.         Report per radiology    Laboratory:  Labs Ordered and Resulted from Time of ED Arrival to Time of ED Departure   COMPREHENSIVE METABOLIC PANEL - Abnormal       Result Value    Sodium 138      Potassium 4.2      Chloride 100      Carbon Dioxide (CO2) 25      Anion Gap 13      Urea Nitrogen 7.5 (*)     Creatinine 0.74      Calcium 9.3      Glucose 122 (*)     Alkaline Phosphatase 109 (*)      AST 26      ALT 30      Protein Total 7.0      Albumin 4.4      Bilirubin Total 0.2      GFR Estimate >90     LIPASE - Normal    Lipase 21     TROPONIN T, HIGH SENSITIVITY - Normal    Troponin T, High Sensitivity <6     D DIMER QUANTITATIVE - Normal    D-Dimer Quantitative 0.32     CBC WITH PLATELETS AND DIFFERENTIAL    WBC Count 6.5      RBC Count 5.06      Hemoglobin 14.4      Hematocrit 45.1      MCV 89      MCH 28.5      MCHC 31.9      RDW 12.4      Platelet Count 224      % Neutrophils 70      % Lymphocytes 19      % Monocytes 8      % Eosinophils 3      % Basophils 0      % Immature Granulocytes 0      NRBCs per 100 WBC 0      Absolute Neutrophils 4.5      Absolute Lymphocytes 1.3      Absolute Monocytes 0.5      Absolute Eosinophils 0.2      Absolute Basophils 0.0      Absolute Immature Granulocytes 0.0      Absolute NRBCs 0.0            Emergency Department Course:             Reviewed:  I reviewed nursing notes, vitals and past medical history    Assessments:   I obtained history and examined the patient as noted above.    I rechecked the patient and explained findings.       Consults:   Dr steward general surgery    Interventions:  Medications   ketorolac (TORADOL) injection 15 mg (15 mg Intravenous Given 12/21/22 0823)   ondansetron (ZOFRAN) injection 4 mg (4 mg Intravenous Given 12/21/22 0822)   0.9% sodium chloride BOLUS (0 mLs Intravenous Stopped 12/21/22 1041)   0.9% sodium chloride BOLUS (0 mLs Intravenous Stopped 12/21/22 1041)   iopamidol (ISOVUE-370) solution 500 mL (100 mLs Intravenous Given 12/21/22 0953)        Disposition:  The patient was discharged to home.     Impression & Plan      Medical Decision Making:  Patient presents to the ER for evaluation of right upper quadrant pain since last night.  Vital signs reassuring.  She has mild tenderness.  No peritoneal signs.  Right upper quadrant ultrasound does not show obvious gallstones or cholecystitis despite previous ultrasound few years  ago showing cholelithiasis.  LFT derangement or signs of PE/myocardial ischemia.  No concerning leukocytosis.  CT abdomen shows gallstones but no other acute pathology.  Patient reports multiple intolerances to opiate pain medications and was interested in admission for symptom control if she could be added to our schedule.  I spoke with general surgery and she could not be added today.  Plan for close interval follow-up and general surgery clinic this week.  Return precautions discussed prior to discharge.  Patient was interested in trial of Percocet.  Recommended taking Zofran prior to this medication to prevent adverse effect of nausea and vomiting.  I also discussed dietary modification that could help with biliary colic symptoms.      Diagnosis:    ICD-10-CM    1. Biliary colic  K80.50            Discharge Medications:  Discharge Medication List as of 12/21/2022 11:18 AM      START taking these medications    Details   ondansetron (ZOFRAN ODT) 4 MG ODT tab Take 1 tablet (4 mg) by mouth every 8 hours as needed for nausea or vomiting, Disp-10 tablet, R-0, E-Prescribe      oxyCODONE-acetaminophen (PERCOCET) 5-325 MG tablet Take 1 tablet by mouth every 4 hours as needed for severe pain (7-10), Disp-12 tablet, R-0, E-Prescribe              12/21/2022   Oniel Finn MD Lindenbaum, Elan, MD  12/21/22 0984

## 2022-12-23 ENCOUNTER — TELEPHONE (OUTPATIENT)
Dept: SURGERY | Facility: CLINIC | Age: 62
End: 2022-12-23

## 2022-12-23 ENCOUNTER — VIRTUAL VISIT (OUTPATIENT)
Dept: SURGERY | Facility: CLINIC | Age: 62
End: 2022-12-23
Payer: COMMERCIAL

## 2022-12-23 VITALS — BODY MASS INDEX: 33.27 KG/M2 | WEIGHT: 207 LBS | HEIGHT: 66 IN

## 2022-12-23 DIAGNOSIS — K80.20 GALLSTONES: Primary | ICD-10-CM

## 2022-12-23 PROCEDURE — 99203 OFFICE O/P NEW LOW 30 MIN: CPT | Mod: 95 | Performed by: SURGERY

## 2022-12-23 RX ORDER — INDOCYANINE GREEN AND WATER 25 MG
2.5 KIT INJECTION ONCE
Status: CANCELLED | OUTPATIENT
Start: 2022-12-23 | End: 2022-12-23

## 2022-12-23 NOTE — TELEPHONE ENCOUNTER
Type of surgery: LAPAROSCOPIC CHOLECYSTECTOMY       Location of surgery: [facility]  Date and time of surgery: 1/6/2023 @ 2:00 PM   Surgeon: Henrietta Dobbs MD   Pre-Op Appt Date:  ER 12/21/2022   Post-Op Appt Date: PATIENT TO SCHEDULE     Packet sent out: Yes  Pre-cert/Authorization completed:  Not Applicable  Date: 12/23/2022      LAPAROSCOPIC CHOLECYSTECTOMY     GENERAL H&P DONE ER 12/21/2022 76  MIN REQ PA ASSIST MGB NMS

## 2022-12-23 NOTE — PROGRESS NOTES
Keena Jorge is a 62 year old year old who is being evaluated via a billable telephone visit.      What phone number would you like to be contacted at? [phone]    Phone call duration: 20 minutes    [facility]  New Patient Telephone Visit    Keena Jorge is a 62 year old female seen in consultation for RUQ and epigastric abdominal pain.    Assessment and Plan:  It is my impression that Keena has symptomatic gallstones.   I have offered her a laparoscopic cholecystectomy.      We have discussed the indication, alternatives, risks and expected recovery.  Specifically we have discussed incisions, scarring, postoperative infections, anesthesia, bleeding, blood transfusion, open conversion, common bile duct injury, injury to intra-abdominal organs, adhesions that can lead to bowel obstruction, retained common bile duct stone, bile leak, DVT, PE, hernia, post cholecystectomy diarrhea, postoperative dietary restrictions and physical limitations.  We have discussed the recommended interventions and treatments for these complications.  All questions have been answered to the best of my ability.           We will schedule surgery at the patient's convenience.  Needs a preop H&P to be performed by PCP.    Chief complaint:  Abdominal pain, epigastric and RUQ    HPI:  Keena Jorge is a 62 year old female who presents with intermittent epigastric and right upper quadrant pain for 2 years.  The pain is not associated with eating any type of food.  Positive for associated symptoms of nausea and vomiting.  Negative for associated symptoms of diarrhea, constipation, fever and chills.  She does not have a history of jaundice or dark urine.  She  has not had pancreatitis in the past.  She was recently seen in the ED for an episode that had lasted more than a day. Ultrasound and CT showed gallstones without active cholecystitis.        Past Medical History:   has no past medical history on file.    Past Surgical  "History:  Past Surgical History:   Procedure Laterality Date     TONSILLECTOMY      2nd grade       Social History:  Social History     Tobacco Use     Smoking status: Former     Types: Cigarettes     Smokeless tobacco: Never   Vaping Use     Vaping Use: Some days     Substances: Nicotine, Flavoring, mostly only 1% nicotine in it   Substance Use Topics     Alcohol use: Yes     Comment: occa     Drug use: No        Family History:  Family History   Problem Relation Age of Onset     Thyroid Disease Mother         Grave's     Myocardial Infarction Mother 72     Chronic Obstructive Pulmonary Disease Father         smoker     Diabetes Maternal Grandmother 75     Lung Cancer Paternal Grandfather         non-smoker     Lymphoma Brother      Thyroid Disease Sister      Diabetes Brother 50     Thyroid Disease Sister         Doesn't do anything about it     Thyroid Disease Sister         Borderline     Colon Cancer No family hx of      Breast Cancer No family hx of      No FH bleeding or clotting problems or reactions to anesthesia    Review of Systems:  The 10 point review of systems is negative other than noted in the HPI and above.    Physical Exam:  Vitals: Ht 1.676 m (5' 6\")   Wt 93.9 kg (207 lb)   LMP  (LMP Unknown)   BMI 33.41 kg/m    BMI= Body mass index is 33.41 kg/m .  Exam deferred (telephone visit)    Relevant labs:    WBC -   Lab Results   Component Value Date    WBC 6.5 12/21/2022       HgB -   Lab Results   Component Value Date    HGB 14.4 12/21/2022       Plt-   Lab Results   Component Value Date     12/21/2022       Liver Function Studies -   Recent Labs   Lab Test 12/21/22  0748   PROTTOTAL 7.0   ALBUMIN 4.4   BILITOTAL 0.2   ALKPHOS 109*   AST 26   ALT 30       Lipase-   Lab Results   Component Value Date    LIPASE 21 12/21/2022         Imaging:  All imaging studies reviewed by me.    Ultrasound shows: positive cholelithiasis, negative gallbladder wall thickening, negative ductal dilatation, " negative pericholecystic fluid, negative sonographic De Anda's sign.    Recent Results (from the past 744 hour(s))   Abdomen US, limited (RUQ only)    Narrative    ULTRASOUND ABDOMEN LIMITED  12/21/2022 8:26 AM    CLINICAL HISTORY: Right upper quadrant pain.    TECHNIQUE: Limited abdominal ultrasound.    COMPARISON: Limited abdominal ultrasound dated 7/24/2019.    FINDINGS:  GALLBLADDER: Multiple hyperechoic foci in the wall of the gallbladder  with dirty shadowing likely represent cholesterol crystals from  adenomyomatosis. These findings are best seen on the cine clips and  are not well seen on the still images. The gallbladder is otherwise  normal in appearance without wall thickening, pericholecystic fluid,  cholelithiasis or sonographic De Anda sign.    BILE DUCTS: Grossly within normal limits. Common bile duct measures  0.3 cm in diameter.    LIVER: There is mild diffuse hyperechogenicity of the liver as  compared to the right kidney most consistent with diffuse hepatic  steatosis. There are several focal areas of decreased echogenicity in  the liver indicating focal fatty sparing with the largest adjacent to  the gallbladder fossa measuring 1.6 x 1.5 x 1.6 cm. No focal  intrahepatic lesion or biliary ductal dilatation.    RIGHT KIDNEY: No hydronephrosis.    PANCREAS: The visualized portions of the pancreas are normal.    No ascites.      Impression    IMPRESSION:  1.  Probable adenomyomatosis of the gallbladder. The gallbladder is  otherwise normal in appearance without cholelithiasis or findings of  acute cholecystitis.  2.  Diffuse hepatic steatosis with focal areas of fatty sparing,  similar to the prior study.  3.  Otherwise negative limited abdominal ultrasound.    RICO CRISTINA MD         SYSTEM ID:  [id number]   CT Abdomen Pelvis w Contrast    Narrative    CT ABDOMEN AND PELVIS WITH CONTRAST  12/21/2022 9:59 AM    HISTORY:  Right upper quadrant pain.    TECHNIQUE: CT scan obtained of the abdomen, and  pelvis with IV  contrast. 100 mL Isovue-370 IV injected. Radiation dose for this scan  was reduced using automated exposure control, adjustment of the mA  and/or kV according to patient size, or iterative reconstruction  technique.    COMPARISON:Abdominal ultrasound on 12/21/2022.    FINDINGS:  Lower chest: Small hiatal hernia.    Abdomen/pelvis:  Hepatobiliary: Diffuse hypoattenuation of the liver, likely due to  underlying hepatic steatosis. Cholelithiasis without CT evidence of  acute cholecystitis.    Pancreas: No main pancreatic ductal dilatation or definite solid  pancreatic mass.    Spleen: No splenomegaly.    Adrenal glands: No adrenal nodules.    Kidneys: No radiodense kidney/ureteral stones or hydronephrosis in the  kidney.    Bowel: No abnormally dilated bowel loops. The appendix is visualized  and appears normal.    Peritoneum: No significant free fluid in the abdomen or pelvis. No  free peritoneal portal venous gas.    Pelvic organs: Unremarkable.    Vascular: Moderate atherosclerotic vascular calcification of the  coronary arteries.    Lymph nodes: No significant abdominopelvic lymphadenopathy.    Bones and soft tissue: No suspicious osseous lesion.      Impression    IMPRESSION:   1. No evidence of acute pathology in the abdomen or pelvis.  2. Significant hepatic steatosis.  3. Cholelithiasis without CT evidence of acute cholecystitis.    JALEN LOU MD         SYSTEM ID:  [id number]         This note was created using voice recognition software. Undetected word substitutions or other errors may have occurred.     Time spent with the patient with greater that 50% of the time in discussion was 35 minutes.     Henrietta Dobbs MD  [facility] [place]    Please route or send letter to:  Primary Care Provider (PCP)

## 2023-01-06 ENCOUNTER — ANESTHESIA EVENT (OUTPATIENT)
Dept: SURGERY | Facility: CLINIC | Age: 63
End: 2023-01-06
Payer: COMMERCIAL

## 2023-01-06 ENCOUNTER — APPOINTMENT (OUTPATIENT)
Dept: SURGERY | Facility: PHYSICIAN GROUP | Age: 63
End: 2023-01-06
Payer: COMMERCIAL

## 2023-01-06 ENCOUNTER — ANESTHESIA (OUTPATIENT)
Dept: SURGERY | Facility: CLINIC | Age: 63
End: 2023-01-06
Payer: COMMERCIAL

## 2023-01-06 ENCOUNTER — HOSPITAL ENCOUNTER (OUTPATIENT)
Facility: CLINIC | Age: 63
Discharge: HOME | End: 2023-01-06
Attending: SURGERY | Admitting: SURGERY
Payer: COMMERCIAL

## 2023-01-06 VITALS
WEIGHT: 202.5 LBS | OXYGEN SATURATION: 92 % | DIASTOLIC BLOOD PRESSURE: 71 MMHG | RESPIRATION RATE: 16 BRPM | HEART RATE: 63 BPM | BODY MASS INDEX: 31.78 KG/M2 | HEIGHT: 67 IN | SYSTOLIC BLOOD PRESSURE: 130 MMHG | TEMPERATURE: 98 F

## 2023-01-06 DIAGNOSIS — K80.80 BILIARY CALCULUS OF OTHER SITE WITHOUT OBSTRUCTION: Primary | ICD-10-CM

## 2023-01-06 PROCEDURE — 250N000009 HC RX 250: Performed by: NURSE ANESTHETIST, CERTIFIED REGISTERED

## 2023-01-06 PROCEDURE — 710N000012 HC RECOVERY PHASE 2, PER MINUTE: Performed by: SURGERY

## 2023-01-06 PROCEDURE — 360N000083 HC SURGERY LEVEL 3 W/ FLUORO, PER MIN: Performed by: SURGERY

## 2023-01-06 PROCEDURE — 88304 TISSUE EXAM BY PATHOLOGIST: CPT | Mod: TC | Performed by: SURGERY

## 2023-01-06 PROCEDURE — 999N000141 HC STATISTIC PRE-PROCEDURE NURSING ASSESSMENT: Performed by: SURGERY

## 2023-01-06 PROCEDURE — 250N000011 HC RX IP 250 OP 636: Performed by: NURSE ANESTHETIST, CERTIFIED REGISTERED

## 2023-01-06 PROCEDURE — 710N000009 HC RECOVERY PHASE 1, LEVEL 1, PER MIN: Performed by: SURGERY

## 2023-01-06 PROCEDURE — 250N000009 HC RX 250: Performed by: ANESTHESIOLOGY

## 2023-01-06 PROCEDURE — 47562 LAPAROSCOPIC CHOLECYSTECTOMY: CPT | Performed by: SURGERY

## 2023-01-06 PROCEDURE — 88304 TISSUE EXAM BY PATHOLOGIST: CPT | Mod: 26 | Performed by: PATHOLOGY

## 2023-01-06 PROCEDURE — 272N000001 HC OR GENERAL SUPPLY STERILE: Performed by: SURGERY

## 2023-01-06 PROCEDURE — 250N000011 HC RX IP 250 OP 636: Performed by: ANESTHESIOLOGY

## 2023-01-06 PROCEDURE — 258N000003 HC RX IP 258 OP 636: Performed by: ANESTHESIOLOGY

## 2023-01-06 PROCEDURE — 250N000009 HC RX 250: Performed by: SURGERY

## 2023-01-06 PROCEDURE — 370N000017 HC ANESTHESIA TECHNICAL FEE, PER MIN: Performed by: SURGERY

## 2023-01-06 PROCEDURE — 250N000011 HC RX IP 250 OP 636: Performed by: SURGERY

## 2023-01-06 PROCEDURE — 258N000001 HC RX 258: Performed by: SURGERY

## 2023-01-06 PROCEDURE — 47562 LAPAROSCOPIC CHOLECYSTECTOMY: CPT | Mod: AS | Performed by: PHYSICIAN ASSISTANT

## 2023-01-06 RX ORDER — LIDOCAINE HYDROCHLORIDE 10 MG/ML
INJECTION, SOLUTION INFILTRATION; PERINEURAL PRN
Status: DISCONTINUED | OUTPATIENT
Start: 2023-01-06 | End: 2023-01-06

## 2023-01-06 RX ORDER — PROPOFOL 10 MG/ML
INJECTION, EMULSION INTRAVENOUS CONTINUOUS PRN
Status: DISCONTINUED | OUTPATIENT
Start: 2023-01-06 | End: 2023-01-06

## 2023-01-06 RX ORDER — INDOCYANINE GREEN AND WATER 25 MG
2.5 KIT INJECTION ONCE
Status: COMPLETED | OUTPATIENT
Start: 2023-01-06 | End: 2023-01-06

## 2023-01-06 RX ORDER — ACETAMINOPHEN 325 MG/1
650 TABLET ORAL
Status: DISCONTINUED | OUTPATIENT
Start: 2023-01-06 | End: 2023-01-06 | Stop reason: HOSPADM

## 2023-01-06 RX ORDER — FENTANYL CITRATE 50 UG/ML
50 INJECTION, SOLUTION INTRAMUSCULAR; INTRAVENOUS EVERY 5 MIN PRN
Status: DISCONTINUED | OUTPATIENT
Start: 2023-01-06 | End: 2023-01-06 | Stop reason: HOSPADM

## 2023-01-06 RX ORDER — FENTANYL CITRATE 50 UG/ML
INJECTION, SOLUTION INTRAMUSCULAR; INTRAVENOUS PRN
Status: DISCONTINUED | OUTPATIENT
Start: 2023-01-06 | End: 2023-01-06

## 2023-01-06 RX ORDER — IBUPROFEN 200 MG
600 TABLET ORAL EVERY 6 HOURS PRN
COMMUNITY
Start: 2023-01-06

## 2023-01-06 RX ORDER — LIDOCAINE 40 MG/G
CREAM TOPICAL
Status: DISCONTINUED | OUTPATIENT
Start: 2023-01-06 | End: 2023-01-06 | Stop reason: HOSPADM

## 2023-01-06 RX ORDER — OXYCODONE HYDROCHLORIDE 5 MG/1
5 TABLET ORAL
Status: DISCONTINUED | OUTPATIENT
Start: 2023-01-06 | End: 2023-01-06 | Stop reason: HOSPADM

## 2023-01-06 RX ORDER — ONDANSETRON 2 MG/ML
INJECTION INTRAMUSCULAR; INTRAVENOUS PRN
Status: DISCONTINUED | OUTPATIENT
Start: 2023-01-06 | End: 2023-01-06

## 2023-01-06 RX ORDER — ACETAMINOPHEN 325 MG/1
975 TABLET ORAL ONCE
Status: DISCONTINUED | OUTPATIENT
Start: 2023-01-06 | End: 2023-01-06 | Stop reason: HOSPADM

## 2023-01-06 RX ORDER — PROPOFOL 10 MG/ML
INJECTION, EMULSION INTRAVENOUS PRN
Status: DISCONTINUED | OUTPATIENT
Start: 2023-01-06 | End: 2023-01-06

## 2023-01-06 RX ORDER — SODIUM CHLORIDE, SODIUM LACTATE, POTASSIUM CHLORIDE, CALCIUM CHLORIDE 600; 310; 30; 20 MG/100ML; MG/100ML; MG/100ML; MG/100ML
INJECTION, SOLUTION INTRAVENOUS CONTINUOUS
Status: DISCONTINUED | OUTPATIENT
Start: 2023-01-06 | End: 2023-01-06 | Stop reason: HOSPADM

## 2023-01-06 RX ORDER — OXYCODONE HYDROCHLORIDE 5 MG/1
5-10 TABLET ORAL EVERY 4 HOURS PRN
Qty: 12 TABLET | Refills: 0 | Status: SHIPPED | OUTPATIENT
Start: 2023-01-06 | End: 2023-11-16

## 2023-01-06 RX ORDER — ONDANSETRON 4 MG/1
4 TABLET, ORALLY DISINTEGRATING ORAL EVERY 30 MIN PRN
Status: DISCONTINUED | OUTPATIENT
Start: 2023-01-06 | End: 2023-01-06 | Stop reason: HOSPADM

## 2023-01-06 RX ORDER — KETOROLAC TROMETHAMINE 30 MG/ML
INJECTION, SOLUTION INTRAMUSCULAR; INTRAVENOUS PRN
Status: DISCONTINUED | OUTPATIENT
Start: 2023-01-06 | End: 2023-01-06

## 2023-01-06 RX ORDER — ALBUTEROL SULFATE 0.83 MG/ML
2.5 SOLUTION RESPIRATORY (INHALATION) EVERY 4 HOURS PRN
Status: DISCONTINUED | OUTPATIENT
Start: 2023-01-06 | End: 2023-01-06 | Stop reason: HOSPADM

## 2023-01-06 RX ORDER — DIMENHYDRINATE 50 MG/ML
25 INJECTION, SOLUTION INTRAMUSCULAR; INTRAVENOUS
Status: DISCONTINUED | OUTPATIENT
Start: 2023-01-06 | End: 2023-01-06 | Stop reason: HOSPADM

## 2023-01-06 RX ORDER — SCOLOPAMINE TRANSDERMAL SYSTEM 1 MG/1
1 PATCH, EXTENDED RELEASE TRANSDERMAL ONCE
Status: DISCONTINUED | OUTPATIENT
Start: 2023-01-06 | End: 2023-01-06 | Stop reason: HOSPADM

## 2023-01-06 RX ORDER — ACETAMINOPHEN 500 MG
1000 TABLET ORAL EVERY 6 HOURS PRN
COMMUNITY
Start: 2023-01-06 | End: 2023-11-16

## 2023-01-06 RX ORDER — CEFAZOLIN SODIUM/WATER 2 G/20 ML
2 SYRINGE (ML) INTRAVENOUS SEE ADMIN INSTRUCTIONS
Status: DISCONTINUED | OUTPATIENT
Start: 2023-01-06 | End: 2023-01-06 | Stop reason: HOSPADM

## 2023-01-06 RX ORDER — LABETALOL HYDROCHLORIDE 5 MG/ML
10 INJECTION, SOLUTION INTRAVENOUS
Status: COMPLETED | OUTPATIENT
Start: 2023-01-06 | End: 2023-01-06

## 2023-01-06 RX ORDER — MEPERIDINE HYDROCHLORIDE 25 MG/ML
12.5 INJECTION INTRAMUSCULAR; INTRAVENOUS; SUBCUTANEOUS
Status: DISCONTINUED | OUTPATIENT
Start: 2023-01-06 | End: 2023-01-06 | Stop reason: HOSPADM

## 2023-01-06 RX ORDER — GLYCOPYRROLATE 0.2 MG/ML
INJECTION, SOLUTION INTRAMUSCULAR; INTRAVENOUS PRN
Status: DISCONTINUED | OUTPATIENT
Start: 2023-01-06 | End: 2023-01-06

## 2023-01-06 RX ORDER — FENTANYL CITRATE 50 UG/ML
25 INJECTION, SOLUTION INTRAMUSCULAR; INTRAVENOUS EVERY 5 MIN PRN
Status: DISCONTINUED | OUTPATIENT
Start: 2023-01-06 | End: 2023-01-06 | Stop reason: HOSPADM

## 2023-01-06 RX ORDER — HYDROMORPHONE HCL IN WATER/PF 6 MG/30 ML
0.4 PATIENT CONTROLLED ANALGESIA SYRINGE INTRAVENOUS EVERY 5 MIN PRN
Status: DISCONTINUED | OUTPATIENT
Start: 2023-01-06 | End: 2023-01-06 | Stop reason: HOSPADM

## 2023-01-06 RX ORDER — HYDROMORPHONE HCL IN WATER/PF 6 MG/30 ML
0.2 PATIENT CONTROLLED ANALGESIA SYRINGE INTRAVENOUS EVERY 5 MIN PRN
Status: DISCONTINUED | OUTPATIENT
Start: 2023-01-06 | End: 2023-01-06 | Stop reason: HOSPADM

## 2023-01-06 RX ORDER — BUPIVACAINE HYDROCHLORIDE 5 MG/ML
INJECTION, SOLUTION EPIDURAL; INTRACAUDAL PRN
Status: DISCONTINUED | OUTPATIENT
Start: 2023-01-06 | End: 2023-01-06 | Stop reason: HOSPADM

## 2023-01-06 RX ORDER — FENTANYL CITRATE 50 UG/ML
25 INJECTION, SOLUTION INTRAMUSCULAR; INTRAVENOUS
Status: DISCONTINUED | OUTPATIENT
Start: 2023-01-06 | End: 2023-01-06 | Stop reason: HOSPADM

## 2023-01-06 RX ORDER — METOPROLOL TARTRATE 1 MG/ML
INJECTION, SOLUTION INTRAVENOUS PRN
Status: DISCONTINUED | OUTPATIENT
Start: 2023-01-06 | End: 2023-01-06

## 2023-01-06 RX ORDER — ONDANSETRON 2 MG/ML
4 INJECTION INTRAMUSCULAR; INTRAVENOUS EVERY 30 MIN PRN
Status: DISCONTINUED | OUTPATIENT
Start: 2023-01-06 | End: 2023-01-06 | Stop reason: HOSPADM

## 2023-01-06 RX ORDER — NEOSTIGMINE METHYLSULFATE 1 MG/ML
VIAL (ML) INJECTION PRN
Status: DISCONTINUED | OUTPATIENT
Start: 2023-01-06 | End: 2023-01-06

## 2023-01-06 RX ORDER — DEXAMETHASONE SODIUM PHOSPHATE 4 MG/ML
INJECTION, SOLUTION INTRA-ARTICULAR; INTRALESIONAL; INTRAMUSCULAR; INTRAVENOUS; SOFT TISSUE PRN
Status: DISCONTINUED | OUTPATIENT
Start: 2023-01-06 | End: 2023-01-06

## 2023-01-06 RX ORDER — CEFAZOLIN SODIUM/WATER 2 G/20 ML
2 SYRINGE (ML) INTRAVENOUS
Status: COMPLETED | OUTPATIENT
Start: 2023-01-06 | End: 2023-01-06

## 2023-01-06 RX ADMIN — PROPOFOL 50 MCG/KG/MIN: 10 INJECTION, EMULSION INTRAVENOUS at 14:37

## 2023-01-06 RX ADMIN — MIDAZOLAM 2 MG: 1 INJECTION INTRAMUSCULAR; INTRAVENOUS at 14:33

## 2023-01-06 RX ADMIN — SCOPALAMINE 1 PATCH: 1 PATCH, EXTENDED RELEASE TRANSDERMAL at 14:18

## 2023-01-06 RX ADMIN — PROCHLORPERAZINE EDISYLATE 5 MG: 5 INJECTION INTRAMUSCULAR; INTRAVENOUS at 17:11

## 2023-01-06 RX ADMIN — SODIUM CHLORIDE, POTASSIUM CHLORIDE, SODIUM LACTATE AND CALCIUM CHLORIDE: 600; 310; 30; 20 INJECTION, SOLUTION INTRAVENOUS at 15:14

## 2023-01-06 RX ADMIN — ONDANSETRON HYDROCHLORIDE 4 MG: 2 INJECTION, SOLUTION INTRAMUSCULAR; INTRAVENOUS at 16:56

## 2023-01-06 RX ADMIN — PROPOFOL 100 MG: 10 INJECTION, EMULSION INTRAVENOUS at 15:01

## 2023-01-06 RX ADMIN — PROPOFOL 200 MG: 10 INJECTION, EMULSION INTRAVENOUS at 14:37

## 2023-01-06 RX ADMIN — LIDOCAINE HYDROCHLORIDE 50 MG: 10 INJECTION, SOLUTION INFILTRATION; PERINEURAL at 14:37

## 2023-01-06 RX ADMIN — SODIUM CHLORIDE, POTASSIUM CHLORIDE, SODIUM LACTATE AND CALCIUM CHLORIDE: 600; 310; 30; 20 INJECTION, SOLUTION INTRAVENOUS at 13:27

## 2023-01-06 RX ADMIN — LABETALOL HYDROCHLORIDE 10 MG: 5 INJECTION, SOLUTION INTRAVENOUS at 16:11

## 2023-01-06 RX ADMIN — KETOROLAC TROMETHAMINE 30 MG: 30 INJECTION, SOLUTION INTRAMUSCULAR at 14:37

## 2023-01-06 RX ADMIN — INDOCYANINE GREEN AND WATER 2.5 MG: KIT at 13:32

## 2023-01-06 RX ADMIN — HYDROMORPHONE HYDROCHLORIDE 1 MG: 1 INJECTION, SOLUTION INTRAMUSCULAR; INTRAVENOUS; SUBCUTANEOUS at 14:37

## 2023-01-06 RX ADMIN — Medication 2 G: at 14:30

## 2023-01-06 RX ADMIN — Medication 50 MG: at 14:37

## 2023-01-06 RX ADMIN — ONDANSETRON 4 MG: 2 INJECTION INTRAMUSCULAR; INTRAVENOUS at 14:37

## 2023-01-06 RX ADMIN — FENTANYL CITRATE 100 MCG: 50 INJECTION, SOLUTION INTRAMUSCULAR; INTRAVENOUS at 14:37

## 2023-01-06 RX ADMIN — GLYCOPYRROLATE 0.6 MG: 0.2 INJECTION, SOLUTION INTRAMUSCULAR; INTRAVENOUS at 15:49

## 2023-01-06 RX ADMIN — NEOSTIGMINE METHYLSULFATE 3 MG: 1 INJECTION, SOLUTION INTRAVENOUS at 15:49

## 2023-01-06 RX ADMIN — METOPROLOL TARTRATE 2 MG: 5 INJECTION INTRAVENOUS at 15:04

## 2023-01-06 RX ADMIN — GLYCOPYRROLATE 0.2 MG: 0.2 INJECTION, SOLUTION INTRAMUSCULAR; INTRAVENOUS at 14:37

## 2023-01-06 RX ADMIN — DEXAMETHASONE SODIUM PHOSPHATE 8 MG: 4 INJECTION, SOLUTION INTRA-ARTICULAR; INTRALESIONAL; INTRAMUSCULAR; INTRAVENOUS; SOFT TISSUE at 14:37

## 2023-01-06 ASSESSMENT — ACTIVITIES OF DAILY LIVING (ADL)
ADLS_ACUITY_SCORE: 35

## 2023-01-06 ASSESSMENT — LIFESTYLE VARIABLES: TOBACCO_USE: 1

## 2023-01-06 NOTE — ANESTHESIA CARE TRANSFER NOTE
Patient: Keena Jorge    Procedure: Procedure(s):  LAPAROSCOPIC CHOLECYSTECTOMY       Diagnosis: Gallstones [K80.20]  Diagnosis Additional Information: No value filed.    Anesthesia Type:   General     Note:      Level of Consciousness: drowsy  Oxygen Supplementation: face mask    Independent Airway: airway patency satisfactory and stable  Dentition: dentition unchanged  Vital Signs Stable: post-procedure vital signs reviewed and stable  Report to RN Given: handoff report given  Patient transferred to: PACU    Handoff Report: Identifed the Patient, Identified the Reponsible Provider, Reviewed the pertinent medical history, Discussed the surgical course, Reviewed Intra-OP anesthesia mangement and issues during anesthesia, Set expectations for post-procedure period and Allowed opportunity for questions and acknowledgement of understanding      Vitals:  Vitals Value Taken Time   BP     Temp     Pulse 85 01/06/23 1604   Resp 14 01/06/23 1604   SpO2 96 % 01/06/23 1604   Vitals shown include unvalidated device data.    Electronically Signed By: DIGNA Peter CRNA  January 6, 2023  4:05 PM

## 2023-01-06 NOTE — ANESTHESIA POSTPROCEDURE EVALUATION
Patient: Keena Jorge    Procedure: Procedure(s):  LAPAROSCOPIC CHOLECYSTECTOMY       Anesthesia Type:  General    Note:  Disposition: Outpatient   Postop Pain Control: Uneventful            Sign Out: Well controlled pain   PONV: No   Neuro/Psych: Uneventful            Sign Out: Acceptable/Baseline neuro status   Airway/Respiratory: Uneventful            Sign Out: Acceptable/Baseline resp. status   CV/Hemodynamics: Uneventful            Sign Out: Acceptable CV status   Other NRE: NONE   DID A NON-ROUTINE EVENT OCCUR? No           Last vitals:  Vitals Value Taken Time   /83 01/06/23 1630   Temp 97.7  F (36.5  C) 01/06/23 1605   Pulse 64 01/06/23 1635   Resp 16 01/06/23 1635   SpO2 93 % 01/06/23 1635   Vitals shown include unvalidated device data.    Electronically Signed By: Jamie Serna MD  January 6, 2023  4:37 PM

## 2023-01-06 NOTE — ANESTHESIA PREPROCEDURE EVALUATION
Anesthesia Pre-Procedure Evaluation    Patient: Keena Jorge   MRN: [id number] : 1960        Procedure : Procedure(s):  LAPAROSCOPIC CHOLECYSTECTOMY          Past Medical History:   Diagnosis Date     PONV (postoperative nausea and vomiting)       Past Surgical History:   Procedure Laterality Date     COLONOSCOPY  2020     TONSILLECTOMY      2nd grade      Allergies   Allergen Reactions     Percocet [Oxycodone-Acetaminophen] Nausea and Vomiting      Social History     Tobacco Use     Smoking status: Former     Types: Cigarettes     Smokeless tobacco: Current     Tobacco comments:     Vape occasional   Substance Use Topics     Alcohol use: Yes     Comment: occasional      Wt Readings from Last 1 Encounters:   23 91.9 kg (202 lb 8 oz)        Anesthesia Evaluation   Pt has had prior anesthetic. Type: General.    History of anesthetic complications  - PONV.      ROS/MED HX  ENT/Pulmonary:     (+) tobacco use, Past use, 1 packs/day, 47  Pack-Year Hx,      Neurologic:  - neg neurologic ROS     Cardiovascular:     (+) hypertension-----    METS/Exercise Tolerance:     Hematologic:  - neg hematologic  ROS     Musculoskeletal:  - neg musculoskeletal ROS     GI/Hepatic:     (+) cholecystitis/cholelithiasis, liver disease,     Renal/Genitourinary:  - neg Renal ROS     Endo: Comment: Class 1 obesity    (+) Obesity,     Psychiatric/Substance Use:  - neg psychiatric ROS     Infectious Disease:  - neg infectious disease ROS     Malignancy:  - neg malignancy ROS     Other:  - neg other ROS          Physical Exam    Airway        Mallampati: II   TM distance: > 3 FB   Neck ROM: full   Mouth opening: > 3 cm    Respiratory Devices and Support         Dental  no notable dental history         Cardiovascular   cardiovascular exam normal       Rhythm and rate: regular and normal     Pulmonary   pulmonary exam normal        breath sounds clear to auscultation       Other findings: Lab Test        22      08/09/19                       0748          0730          1016          WBC          6.5          4.7          5.7           HGB          14.4         13.8         13.9          MCV          89           88           89            PLT          224          187          215            Lab Test        12/21/22 06/30/21 08/09/19                       0748          0730          1016          NA           138          140          141           POTASSIUM    4.2          4.0          3.9           CHLORIDE     100          108          108           CO2          25           26           27            BUN          7.5*         9            9             CR           0.74         0.71         0.81          ANIONGAP     13           6            6             ANY          9.3          9.0          9.1           GLC          122*         108*         98                OUTSIDE LABS:  CBC:   Lab Results   Component Value Date    WBC 6.5 12/21/2022    WBC 4.7 06/30/2021    HGB 14.4 12/21/2022    HGB 13.8 06/30/2021    HCT 45.1 12/21/2022    HCT 41.8 06/30/2021     12/21/2022     06/30/2021     BMP:   Lab Results   Component Value Date     12/21/2022     06/30/2021    POTASSIUM 4.2 12/21/2022    POTASSIUM 4.0 06/30/2021    CHLORIDE 100 12/21/2022    CHLORIDE 108 06/30/2021    CO2 25 12/21/2022    CO2 26 06/30/2021    BUN 7.5 (L) 12/21/2022    BUN 9 06/30/2021    CR 0.74 12/21/2022    CR 0.71 06/30/2021     (H) 12/21/2022     (H) 06/30/2021     COAGS: No results found for: PTT, INR, FIBR  POC: No results found for: BGM, HCG, HCGS  HEPATIC:   Lab Results   Component Value Date    ALBUMIN 4.4 12/21/2022    PROTTOTAL 7.0 12/21/2022    ALT 30 12/21/2022    AST 26 12/21/2022    ALKPHOS 109 (H) 12/21/2022    BILITOTAL 0.2 12/21/2022     OTHER:   Lab Results   Component Value Date    A1C 5.5 08/09/2019    ANY 9.3 12/21/2022    LIPASE 21 12/21/2022    TSH 1.09 08/09/2019       Anesthesia  Plan    ASA Status:  2   NPO Status:  NPO Appropriate    Anesthesia Type: General.     - Airway: ETT   Induction: Intravenous.   Maintenance: Balanced.        Consents    Anesthesia Plan(s) and associated risks, benefits, and realistic alternatives discussed. Questions answered and patient/representative(s) expressed understanding.     - Discussed: Risks, Benefits and Alternatives for BOTH SEDATION and the PROCEDURE were discussed     - Discussed with:  Patient      - Extended Intubation/Ventilatory Support Discussed: No.      - Patient is DNR/DNI Status: No    Use of blood products discussed: No .     Postoperative Care    Pain management: IV analgesics, Oral pain medications, Multi-modal analgesia.   PONV prophylaxis: Ondansetron (or other 5HT-3), Dexamethasone or Solumedrol, Background Propofol Infusion     Comments:                Param Ramirez MD

## 2023-01-06 NOTE — OP NOTE
General Surgery Operative Note    PREOPERATIVE DIAGNOSIS:  Gallstones [K80.20]    POSTOPERATIVE DIAGNOSIS:  Acute on chronic cholecystitis    PROCEDURE:  Laparoscopic Cholecystectomy    ANESTHESIA:  General    PREOPERATIVE MEDICATIONS:  Ancef IV.    SURGEON:  Henrietta Dobbs MD    ASSISTANT:  Joseph Hidalgo PA-C    ESTIMATED BLOOD LOSS:  30 ml    INDICATIONS:  Keena Jorge is a 62 year old female who has been experiencing episodes of RUQ abdominal pain for the past week associated with nausea and vomiting.  Abdominal imaging has revealed gallstones.  She now presents for laparoscopic cholecystectomy after having risks and benefits reviewed in detail.    PROCEDURE:   A supraumbilical incision was made and the abdomen was entered using a Jackson trocar technique.  Secondary trocars were placed under laparoscopic guidance.  We proceeded in the usual fashion first finding the gallbladder neck cystic duct junction.  The gallbladder was too taut to grasp, so a needle aspirator was used to withdraw ~ 45 ml of light green bile. The gallbladder infundibulum was then identified and cleared of inflammatory adhesions. The cystic artery was similarly identified.  We followed the gallbladder infundibulum down and were surprised to find an additional tubular structure eminating from the posterior aspect of the cystic duct. We used ICG to further delineate the biliary anatomy and were able to clearly see the common bile duct and the cystic duct confluence, then there was another duct from the posterior aspect of the cystic which appeared to directly enter the liver.  I called my partner, Dr. Merrill into the operating room, and he confirmed the unusual anatomy with me. The cystic artery was triply clipped and divided. We decided to divide the gallbladder infundibulum above the level of the accessory duct to maintain its patency. We used a white load of an endo-VICKI stapler to divide the gallbladder infundibulum.  The gallbladder  was removed from the gallbladder bed using cautery.  Once free, the gallbladder was extracted from the supraumbilical site in an EndoCatch bag.  The liver bed was inspected for hemostasis, which was ensured.  Trocar sites were then infiltrated with 0.5% Marcaine plain. The supraumbilical fascial opening was closed with interrupted 0 Vicryl. The skin was closed using 4-0 subcuticular vicryl. Steri-Strips were placed on the incisions. The patient was transferred to recovery in good condition.        INTRAOPERATIVE FINDINGS: moderaly inflamed gallbladder with an accessory bile duct eminating from the posterior aspect of the cystic duct.     Specimens:   ID Type Source Tests Collected by Time Destination   1 : Gallbladder Tissue Gallbladder SURGICAL PATHOLOGY EXAM Henrietta Dobbs MD 1/6/2023  3:34 PM        Henrietta Dobbs MD

## 2023-01-06 NOTE — DISCHARGE INSTRUCTIONS
HOME CARE FOLLOWING ABDOMINAL SURGERY  KEYLA Lawler, KARRIE Rowe C. Pratt, J. Shaheen    INCISIONAL CARE:  Replace the bandage over your incision (or incisions) until all drainage stops, or if more comfortable to have in place.  If present, leave the steri-strips (white paper tapes) in place for 14 days after surgery.  If you have staples in your incision at the time of discharge, they will be removed at your follow-up appointment.      BATHING:  Avoid baths for 1 week after surgery.  Showers are okay.  You may wash your hair at any time.  Gently pat your incision dry after bathing.    ACTIVITY:  Light Activity -- you may immediately be up and about as tolerated.  Driving -- you may drive when comfortable and off narcotic pain medications.  Light Work -- resume when comfortable off pain medications.  (If you can drive, you probably can work.)  Strenuous Work/Activity -- limit lifting to 20 pounds for 4 weeks.  Then, progressively increase with time.  Active Sports (running, biking, etc.) -- cautiously resume after 6 weeks.    DISCOMFORT:  Use pain medications as prescribed by your surgeon.  Take the pain medication with some food, when possible, to minimize side effects.  Expect gradual improvement.    DIET:  Return to diet you were on before surgery, unless you are given specific diet instructions.  Drink plenty of fluids.  While taking pain medications, increase dietary fiber or add a fiber supplementation like Metamucil or Citrucel to help prevent constipation - a possible side effect of pain medications.    NAUSEA:  If nauseated from the anesthetic/pain meds; rest in bed, get up cautiously with assistance, and drink clear liquids (juice, tea, broth).    RETURN APPOINTMENT:  Schedule a follow-up visit 2-3 weeks after discharge from the hospital.  Office Phone:  [phone]     CONTACT US IF THE FOLLOWING DEVELOPS:   1. A fever that is above 101     2. If there is a large amount of  What Is The Reason For Today's Visit?: Full Body Skin Examination What Is The Reason For Today's Visit? (Being Monitored For X): concerning skin lesions on an annual basis drainage, bleeding, or swelling.   3. Severe pain that is not relieved by your prescription.   4. Drainage that is thick, cloudy, yellow, green or white.   5. Any other questions not answered by  Frequently Asked Questions  sheet.    GENERAL ANESTHESIA OR SEDATION ADULT DISCHARGE INSTRUCTIONS   SPECIAL PRECAUTIONS FOR 24 HOURS AFTER SURGERY    IT IS NOT UNUSUAL TO FEEL LIGHT-HEADED OR FAINT, UP TO 24 HOURS AFTER SURGERY OR WHILE TAKING PAIN MEDICATION.  IF YOU HAVE THESE SYMPTOMS; SIT FOR A FEW MINUTES BEFORE STANDING AND HAVE SOMEONE ASSIST YOU WHEN YOU GET UP TO WALK OR USE THE BATHROOM.    YOU SHOULD REST AND RELAX FOR THE NEXT 24 HOURS AND YOU MUST MAKE ARRANGEMENTS TO HAVE SOMEONE STAY WITH YOU FOR AT LEAST 24 HOURS AFTER YOUR DISCHARGE.  AVOID HAZARDOUS AND STRENUOUS ACTIVITIES.  DO NOT MAKE IMPORTANT DECISIONS FOR 24 HOURS.    DO NOT DRIVE ANY VEHICLE OR OPERATE MECHANICAL EQUIPMENT FOR 24 HOURS FOLLOWING THE END OF YOUR SURGERY.  EVEN THOUGH YOU MAY FEEL NORMAL, YOUR REACTIONS MAY BE AFFECTED BY THE MEDICATION YOU HAVE RECEIVED.    DO NOT DRINK ALCOHOLIC BEVERAGES FOR 24 HOURS FOLLOWING YOUR SURGERY.    DRINK CLEAR LIQUIDS (APPLE JUICE, GINGER ALE, 7-UP, BROTH, ETC.).  PROGRESS TO YOUR REGULAR DIET AS YOU FEEL ABLE.    YOU MAY HAVE A DRY MOUTH, A SORE THROAT, MUSCLES ACHES OR TROUBLE SLEEPING.  THESE SHOULD GO AWAY AFTER 24 HOURS.    CALL YOUR DOCTOR FOR ANY OF THE FOLLOWING:  SIGNS OF INFECTION (FEVER, GROWING TENDERNESS AT THE SURGERY SITE, A LARGE AMOUNT OF DRAINAGE OR BLEEDING, SEVERE PAIN, FOUL-SMELLING DRAINAGE, REDNESS OR SWELLING.    IT HAS BEEN OVER 8 TO 10 HOURS SINCE SURGERY AND YOU ARE STILL NOT ABLE TO URINATE (PASS WATER).     Maximum acetaminophen (Tylenol) dose from all sources should not exceed 4 grams (4000 mg) per day.    You received Toradol, an IV form of Ibuprofen (Motrin) at 2:30 PM.  Do not take any Ibuprofen products until 8:30 PM.    Take off Scope Patch: Saturday, January 7th at  8pm     How Severe Are Your Spot(S)?: mild

## 2023-01-06 NOTE — ANESTHESIA PROCEDURE NOTES
Airway       Patient location during procedure: OR       Procedure Start/Stop Times: 1/6/2023 2:39 PM  Staff -        CRNA: Shannon Hobbs APRN CRNA       Performed By: CRNA  Consent for Airway        Urgency: elective  Indications and Patient Condition       Indications for airway management: gillian-procedural       Induction type:intravenous       Mask difficulty assessment: 1 - vent by mask    Final Airway Details       Final airway type: endotracheal airway       Successful airway: ETT - single and Oral  Endotracheal Airway Details        ETT size (mm): 7.0       Cuffed: yes       Successful intubation technique: direct laryngoscopy       DL Blade Type: Kumar 2       Grade View of Cords: 1       Adjucts: stylet       Position: Right       Measured from: lips       Secured at (cm): 22       Bite block used: None    Post intubation assessment        Placement verified by: capnometry, equal breath sounds and chest rise        Number of attempts at approach: 1       Number of other approaches attempted: 0       Secured with: plastic tape       Ease of procedure: easy       Dentition: Intact and Unchanged    Medication(s) Administered   Medication Administration Time: 1/6/2023 2:39 PM

## 2023-01-10 LAB
PATH REPORT.COMMENTS IMP SPEC: NORMAL
PATH REPORT.COMMENTS IMP SPEC: NORMAL
PATH REPORT.FINAL DX SPEC: NORMAL
PATH REPORT.GROSS SPEC: NORMAL
PATH REPORT.MICROSCOPIC SPEC OTHER STN: NORMAL
PATH REPORT.RELEVANT HX SPEC: NORMAL
PHOTO IMAGE: NORMAL

## 2023-01-26 ENCOUNTER — TELEPHONE (OUTPATIENT)
Dept: SURGERY | Facility: CLINIC | Age: 63
End: 2023-01-26
Payer: COMMERCIAL

## 2023-01-26 PROCEDURE — 99024 POSTOP FOLLOW-UP VISIT: CPT | Performed by: PHYSICIAN ASSISTANT

## 2023-01-26 NOTE — TELEPHONE ENCOUNTER
[facility] Postoperative Call Note:     Keena Jorge was called for an update regarding her recovery. She underwent a laparoscopic cholecystectomy by Dr. Dobbs on 1/06/23. Today she tells me she is doing well and denies any complaints. She is eating a normal diet and her bowels are regular. She states her wounds are healing well.    The pathology revealed minimal chronic cholecystitis and cholelithiasis. This was discussed with the patient.     Patient was instructed to slowly and gradually resume all normal activities.The patient states all of her questions were answered. She understands our discussion. She agrees to follow up as needed or to call our office with any concerns.    Joseph Hidalgo PA-C      Please route or send letter to:  Primary Care Provider (PCP)

## 2023-09-05 NOTE — MR AVS SNAPSHOT
After Visit Summary   8/30/2018    Keena Jorge    MRN: [id number]           Patient Information     Date Of Birth          1960        Visit Information        Provider Department      8/30/2018 2:00 PM Ashley Sol PA-C [facility]        Today's Diagnoses     Urinary tract infection with hematuria, site unspecified    -  1    Dysuria        Nonspecific finding on examination of urine          Care Instructions      Understanding Urinary Tract Infections (UTIs)  Most UTIs are caused by bacteria, although they may also be caused by viruses or fungi. Bacteria from the bowel are the most common source of infection. The infection may start because of any of the following:    Sexual activity. During sex, bacteria can travel from the penis, vagina, or rectum into the urethra.     Bacteria on the skin outside the rectum may travel into the urethra. This is more common in women since the rectum and urethra are closer to each other than in men. Wiping from front to back after using the toilet and keeping the area clean can help prevent germs from getting to the urethra.    Blockage of urine flow through the urinary tract. If urine sits too long, germs may start to grow out of control.      Parts of the urinary tract  The infection can occur in any part of the urinary tract.    The kidneys collect and store urine.    The ureters carry urine from the kidneys to the bladder.    The bladder holds urine until you are ready to let it out.    The urethra carries urine from the bladder out of the body. It is shorter in women, so bacteria can move through it more easily. The urethra is longer in men, so a UTI is less likely to reach the bladder or kidneys in men.  Date Last Reviewed: 1/1/2017 2000-2017 [employer]. [address] [place] [address], [place] [address]. All rights reserved. This information is not intended as a substitute for professional medical care. Always follow your  From: Yue Lacy  Sent: 8/30/2023 4:35 AM CDT  To: [facility] Nurse Msg Pool  Subject: Restrictions     Also to elaborate on the the note I still am unable to go back to work until the restriction/accommodation process is completed . I’m not sure if that’s something required since I’ve been out of work since 7/28 . Thank you in advance .  Yue   "healthcare professional's instructions.                Follow-ups after your visit        Follow-up notes from your care team     Return if symptoms worsen or fail to improve.      Who to contact     If you have questions or need follow up information about today's clinic visit or your schedule please contact [facility] directly at [phone].  Normal or non-critical lab and imaging results will be communicated to you by MyChart, letter or phone within 4 business days after the clinic has received the results. If you do not hear from us within 7 days, please contact the clinic through MyChart or phone. If you have a critical or abnormal lab result, we will notify you by phone as soon as possible.  Submit refill requests through V[employer] or call your pharmacy and they will forward the refill request to us. Please allow 3 business days for your refill to be completed.          Additional Information About Your Visit        Care EveryWhere ID     This is your Care EveryWhere ID. This could be used by other organizations to access your [facility] medical records  [id number]        Your Vitals Were     Pulse Temperature Respirations Height Pulse Oximetry BMI (Body Mass Index)    100 98.2  F (36.8  C) (Oral) 16 5' 6.75\" (1.695 m) 94% 29.68 kg/m2       Blood Pressure from Last 3 Encounters:   08/30/18 126/80    Weight from Last 3 Encounters:   08/30/18 188 lb 1.6 oz (85.3 kg)              We Performed the Following     UA reflex to Microscopic and Culture     Urine Culture Aerobic Bacterial          Today's Medication Changes          These changes are accurate as of 8/30/18  2:32 PM.  If you have any questions, ask your nurse or doctor.               Start taking these medicines.        Dose/Directions    phenazopyridine 97.5 MG tablet   Commonly known as:  AZO   Used for:  Dysuria   Started by:  Ashley Sol PA-C        Dose:  195 mg   Take 2 tablets (195 mg) by mouth 3 times daily   Quantity:  " 12 tablet   Refills:  0       sulfamethoxazole-trimethoprim 800-160 MG per tablet   Commonly known as:  BACTRIM DS/SEPTRA DS   Used for:  Urinary tract infection with hematuria, site unspecified   Started by:  Ashley Sol PA-C        Dose:  1 tablet   Take 1 tablet by mouth 2 times daily for 3 days   Quantity:  6 tablet   Refills:  0            Where to get your medicines      These medications were sent to [facility] [address] [place] [address]  [address] [facility] [place] [address]     Phone:  [phone]     phenazopyridine 97.5 MG tablet    sulfamethoxazole-trimethoprim 800-160 MG per tablet                Primary Care Provider    None Specified       No primary provider on file.        Equal Access to Services     [facility]: Dany Em, waelaineda nick, qaybta kaalmakenny montes de oca, beka almonte . So [facility] [phone].    ATENCIÓN: Si habla español, tiene a vigil disposición servicios gratuitos de asistencia lingüística. [facility] [phone].    We comply with applicable federal civil rights laws and [place] laws. We do not discriminate on the basis of race, color, national origin, age, disability, sex, sexual orientation, or gender identity.            Thank you!     Thank you for choosing [facility]  for your care. Our goal is always to provide you with excellent care. Hearing back from our patients is one way we can continue to improve our services. Please take a few minutes to complete the written survey that you may receive in the mail after your visit with us. Thank you!             Your Updated Medication List - Protect others around you: Learn how to safely use, store and throw away your medicines at www.disposemymeds.org.          This list is accurate as of 8/30/18  2:32 PM.  Always use your most recent med list.                   Brand Name Dispense Instructions for use Diagnosis    phenazopyridine 97.5 MG  tablet    AZO    12 tablet    Take 2 tablets (195 mg) by mouth 3 times daily    Dysuria       sulfamethoxazole-trimethoprim 800-160 MG per tablet    BACTRIM DS/SEPTRA DS    6 tablet    Take 1 tablet by mouth 2 times daily for 3 days    Urinary tract infection with hematuria, site unspecified

## 2023-09-14 DIAGNOSIS — R06.2 WHEEZING: ICD-10-CM

## 2023-09-14 NOTE — TELEPHONE ENCOUNTER
Patient calling, requesting refills for albuterol inhaler and flovent. Patient states she has a physical scheduled for November. Routing to refill pool to follow protocol.     Al RAMIRES RN 9/14/2023 at 3:56 PM

## 2023-09-14 NOTE — TELEPHONE ENCOUNTER
Routing refill request to provider for review/approval because:  Patient needs to be seen because it has been more than 1 year since last office visit.  Patient does not have an Asthma Control Assessment score on file in the past 6 months.         8/9/2019    11:26 AM 1/7/2021    11:16 AM   ACT Total Scores   ACT TOTAL SCORE (Goal Greater than or Equal to 20) 16 23   In the past 12 months, how many times did you visit the emergency room for your asthma without being admitted to the hospital? 2 0   In the past 12 months, how many times were you hospitalized overnight because of your asthma? 0 0     Taisha NORRIS RN   [facility]

## 2023-09-15 ENCOUNTER — MYC MEDICAL ADVICE (OUTPATIENT)
Dept: PEDIATRICS | Facility: CLINIC | Age: 63
End: 2023-09-15
Payer: COMMERCIAL

## 2023-09-15 RX ORDER — ALBUTEROL SULFATE 90 UG/1
2 POWDER, METERED RESPIRATORY (INHALATION) EVERY 4 HOURS PRN
Qty: 2 EACH | Refills: 0 | Status: SHIPPED | OUTPATIENT
Start: 2023-09-15 | End: 2023-09-20

## 2023-09-15 RX ORDER — FLUTICASONE PROPIONATE 44 UG/1
1 AEROSOL, METERED RESPIRATORY (INHALATION) 2 TIMES DAILY
Qty: 10.6 G | Refills: 1 | Status: SHIPPED | OUTPATIENT
Start: 2023-09-15 | End: 2023-09-20

## 2023-09-15 ASSESSMENT — ASTHMA QUESTIONNAIRES: ACT_TOTALSCORE: 20

## 2023-09-19 ENCOUNTER — TELEPHONE (OUTPATIENT)
Dept: PEDIATRICS | Facility: CLINIC | Age: 63
End: 2023-09-19
Payer: COMMERCIAL

## 2023-09-19 NOTE — TELEPHONE ENCOUNTER
"Spoke with patient via phone (see telephone encounter). Per chart review:    \"Pt calling to inquire about her recent refill request for her asthma medication. Informed patient that these were refilled 9/15/23 and sent to the [facility] on [address]. Pt verbalized understanding.     Travon Campoverde RN on 9/19/2023 at 4:00 PM\"  "

## 2023-09-19 NOTE — TELEPHONE ENCOUNTER
Pt calling to inquire about her recent refill request for her asthma medication. Informed patient that these were refilled 9/15/23 and sent to the [facility] on [address]. Pt verbalized understanding.    Travon Campoverde RN on 9/19/2023 at 4:00 PM

## 2023-09-20 ENCOUNTER — TELEPHONE (OUTPATIENT)
Dept: PEDIATRICS | Facility: CLINIC | Age: 63
End: 2023-09-20

## 2023-09-20 DIAGNOSIS — R06.2 WHEEZING: ICD-10-CM

## 2023-09-20 RX ORDER — ALBUTEROL SULFATE 90 UG/1
2 POWDER, METERED RESPIRATORY (INHALATION) EVERY 4 HOURS PRN
Qty: 2 EACH | Refills: 0 | Status: SHIPPED | OUTPATIENT
Start: 2023-09-20 | End: 2023-11-16

## 2023-09-20 RX ORDER — FLUTICASONE PROPIONATE 44 UG/1
1 AEROSOL, METERED RESPIRATORY (INHALATION) 2 TIMES DAILY
Qty: 10.6 G | Refills: 1 | Status: SHIPPED | OUTPATIENT
Start: 2023-09-20 | End: 2023-09-25

## 2023-09-20 NOTE — TELEPHONE ENCOUNTER
Patient calling stating pharmacy does not have a inhaler in stock and insurance will not cover another one.       Called pharmacy , spoke with Carmen and she stated they do not have either prescription on her file. Resending scripts.    CONNOR Nagy on 9/20/2023 at 3:33 PM

## 2023-09-22 NOTE — TELEPHONE ENCOUNTER
Proair is not covered    Alternatives:     albuterol hfa 90 mcg inhaler, levalbuterol tar hfa 45mcg inh    Fluticasone is not covered    Alternatives:    Pulmicort 90 mcg flexhaler   all other ROS negative except as per HPI

## 2023-09-25 DIAGNOSIS — R06.2 WHEEZING: ICD-10-CM

## 2023-09-25 RX ORDER — ALBUTEROL SULFATE 90 UG/1
2 POWDER, METERED RESPIRATORY (INHALATION) EVERY 4 HOURS PRN
Qty: 2 EACH | Refills: 0 | Status: CANCELLED | OUTPATIENT
Start: 2023-09-25

## 2023-09-25 NOTE — TELEPHONE ENCOUNTER
Alternative requested: the prescribed medication is not covered by insurance. Please consider changing to one of the suggested covered alternative.     Alternative requested: Insurance will not cover respiclick.

## 2023-09-26 RX ORDER — ALBUTEROL SULFATE 90 UG/1
2 AEROSOL, METERED RESPIRATORY (INHALATION) EVERY 6 HOURS PRN
Qty: 18 G | Refills: 0 | Status: SHIPPED | OUTPATIENT
Start: 2023-09-26 | End: 2023-11-16

## 2023-09-26 RX ORDER — FLUTICASONE PROPIONATE 44 UG/1
1 AEROSOL, METERED RESPIRATORY (INHALATION) 2 TIMES DAILY
Qty: 10.6 G | Refills: 0 | Status: SHIPPED | OUTPATIENT
Start: 2023-09-26 | End: 2023-11-16

## 2023-10-08 ENCOUNTER — HEALTH MAINTENANCE LETTER (OUTPATIENT)
Age: 63
End: 2023-10-08

## 2023-11-15 ASSESSMENT — ENCOUNTER SYMPTOMS
FEVER: 0
SHORTNESS OF BREATH: 1
COUGH: 0
HEMATOCHEZIA: 0
ABDOMINAL PAIN: 0
EYE PAIN: 0
CONSTIPATION: 0
FREQUENCY: 0
ARTHRALGIAS: 1
PALPITATIONS: 0
CHILLS: 0
NAUSEA: 0
MYALGIAS: 0
HEADACHES: 1
JOINT SWELLING: 0
DYSURIA: 0
WEAKNESS: 0
HEMATURIA: 0
NERVOUS/ANXIOUS: 0
DIZZINESS: 1
HEARTBURN: 1
PARESTHESIAS: 0
BREAST MASS: 0
DIARRHEA: 0
SORE THROAT: 0

## 2023-11-15 NOTE — COMMUNITY RESOURCES LIST (ENGLISH)
11/15/2023   [facility] [employer]  N/A  For questions about this resource list or additional care needs, please contact your primary care clinic or care manager.  Phone: [phone]   Email: N/A   Address: [address] [place] [address]   Hours: N/A        Financial Stability       Utility payment assistance  1  [address] [place] [address] Distance: 0.48 miles      In-Person, Phone/Virtual   [address] [place] [address]  Language: English  Hours: Mon - Thu 9:00 AM - 4:00 PM  Fees: Free   Phone: (380) 148-4052 Email: info@[employer] Website: https://citibuddies.org/     2  [facility] [employer] Distance: 2.09 miles      In-Person, Phone/Virtual   [address] [place] [address]  Language: English  Hours: Mon 4:00 PM - 6:00 PM , Tue 11:00 AM - 1:00 PM , Wed 4:00 PM - 6:00 PM , Thu 10:30 AM - 12:30 PM  Fees: Free   Phone: (283) 802-6340 Email: resources@[employer].org Website: https://popmn.org/mission/mission-outpost/          Important Numbers & Websites       Emergency Services   911  [facility] Services   311  Poison Control   (391) 507-9451  Suicide Prevention Lifeline   (101) 853-6739 (TALK)  Child Abuse Hotline   (541) 399-4509 (4-A-Child)  Sexual Assault Hotline   (861) 519-6940 (HOPE)  National Runaway Safeline   (839) 445-3847 (RUNAWAY)  All-Options Talkline   (328) 517-1394  Substance Abuse Referral   (450) 732-6375 (HELP)

## 2023-11-16 ENCOUNTER — OFFICE VISIT (OUTPATIENT)
Dept: PEDIATRICS | Facility: CLINIC | Age: 63
End: 2023-11-16
Payer: COMMERCIAL

## 2023-11-16 VITALS
RESPIRATION RATE: 16 BRPM | SYSTOLIC BLOOD PRESSURE: 126 MMHG | DIASTOLIC BLOOD PRESSURE: 74 MMHG | HEIGHT: 67 IN | TEMPERATURE: 98.1 F | WEIGHT: 205 LBS | BODY MASS INDEX: 32.18 KG/M2 | OXYGEN SATURATION: 94 % | HEART RATE: 91 BPM

## 2023-11-16 DIAGNOSIS — Z12.11 SCREEN FOR COLON CANCER: ICD-10-CM

## 2023-11-16 DIAGNOSIS — E55.9 VITAMIN D DEFICIENCY: ICD-10-CM

## 2023-11-16 DIAGNOSIS — R06.83 SNORING: ICD-10-CM

## 2023-11-16 DIAGNOSIS — Z13.220 SCREENING CHOLESTEROL LEVEL: ICD-10-CM

## 2023-11-16 DIAGNOSIS — Z00.00 ROUTINE GENERAL MEDICAL EXAMINATION AT A HEALTH CARE FACILITY: Primary | ICD-10-CM

## 2023-11-16 DIAGNOSIS — R06.02 SHORTNESS OF BREATH: ICD-10-CM

## 2023-11-16 DIAGNOSIS — Z13.1 SCREENING FOR DIABETES MELLITUS: ICD-10-CM

## 2023-11-16 DIAGNOSIS — R06.2 WHEEZING: ICD-10-CM

## 2023-11-16 PROBLEM — R73.03 PREDIABETES: Status: ACTIVE | Noted: 2023-11-16

## 2023-11-16 LAB — HBA1C MFR BLD: 6.3 % (ref 0–5.6)

## 2023-11-16 PROCEDURE — 87635 SARS-COV-2 COVID-19 AMP PRB: CPT | Performed by: INTERNAL MEDICINE

## 2023-11-16 PROCEDURE — 80061 LIPID PANEL: CPT | Performed by: INTERNAL MEDICINE

## 2023-11-16 PROCEDURE — 84443 ASSAY THYROID STIM HORMONE: CPT | Performed by: INTERNAL MEDICINE

## 2023-11-16 PROCEDURE — 99396 PREV VISIT EST AGE 40-64: CPT | Performed by: INTERNAL MEDICINE

## 2023-11-16 PROCEDURE — 36415 COLL VENOUS BLD VENIPUNCTURE: CPT | Performed by: INTERNAL MEDICINE

## 2023-11-16 PROCEDURE — 83036 HEMOGLOBIN GLYCOSYLATED A1C: CPT | Performed by: INTERNAL MEDICINE

## 2023-11-16 PROCEDURE — 82306 VITAMIN D 25 HYDROXY: CPT | Performed by: INTERNAL MEDICINE

## 2023-11-16 PROCEDURE — 80053 COMPREHEN METABOLIC PANEL: CPT | Performed by: INTERNAL MEDICINE

## 2023-11-16 PROCEDURE — 99213 OFFICE O/P EST LOW 20 MIN: CPT | Mod: 25 | Performed by: INTERNAL MEDICINE

## 2023-11-16 RX ORDER — TIOTROPIUM BROMIDE 18 UG/1
18 CAPSULE ORAL; RESPIRATORY (INHALATION) DAILY
Status: CANCELLED | OUTPATIENT
Start: 2023-11-16

## 2023-11-16 RX ORDER — BUDESONIDE AND FORMOTEROL FUMARATE DIHYDRATE 80; 4.5 UG/1; UG/1
AEROSOL RESPIRATORY (INHALATION)
Qty: 20.4 G | Refills: 11 | Status: SHIPPED | OUTPATIENT
Start: 2023-11-16

## 2023-11-16 ASSESSMENT — ENCOUNTER SYMPTOMS
HEARTBURN: 1
FREQUENCY: 0
DIARRHEA: 0
SORE THROAT: 0
SHORTNESS OF BREATH: 1
DYSURIA: 0
NERVOUS/ANXIOUS: 0
EYE PAIN: 0
HEMATOCHEZIA: 0
COUGH: 0
BREAST MASS: 0
FEVER: 0
JOINT SWELLING: 0
WEAKNESS: 0
NAUSEA: 0
PALPITATIONS: 0
ABDOMINAL PAIN: 0
ARTHRALGIAS: 1
HEMATURIA: 0
MYALGIAS: 0
CHILLS: 0
PARESTHESIAS: 0
DIZZINESS: 1
CONSTIPATION: 0
HEADACHES: 1

## 2023-11-16 ASSESSMENT — PAIN SCALES - GENERAL: PAINLEVEL: NO PAIN (0)

## 2023-11-16 NOTE — PROGRESS NOTES
SUBJECTIVE:   Keena is a 63 year old, presenting for the following:  Physical        2023     8:16 AM   Additional Questions   Roomed by Christal JUNG   Accompanied by Self         2023     8:16 AM   Patient Reported Additional Medications   Patient reports taking the following new medications n/a       Healthy Habits:     Getting at least 3 servings of Calcium per day:  NO    Bi-annual eye exam:  Yes    Dental care twice a year:  NO    Sleep apnea or symptoms of sleep apnea:  None    Diet:  Regular (no restrictions)    Frequency of exercise:  None    Taking medications regularly:  Yes    Medication side effects:  None    Additional concerns today:  No    Today's PHQ-2 Score:       11/15/2023     2:59 PM   PHQ-2 (  [employer])   Q1: Little interest or pleasure in doing things 0   Q2: Feeling down, depressed or hopeless 0   PHQ-2 Score 0   Q1: Little interest or pleasure in doing things Not at all   Q2: Feeling down, depressed or hopeless Not at all   PHQ-2 Score 0     # Social   - bought a 16 foot bass boat, pulls it  - she likes [employer], went to 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 Smoking history - could be copd vs asthma (worse w/ chlorine). For now will continue as she has improved significantly.        Social History     Tobacco Use    Smoking status: Former     Packs/day: .5     Types: Cigarettes     Start date: 1978     Quit date: 2016     Years since quittin.8    Smokeless tobacco: Current    Tobacco comments:     Vape occasional   Substance Use Topics    Alcohol use: Yes     Comment: occasional             11/15/2023     2:59 PM    Alcohol Use   Prescreen: >3 drinks/day or >7 drinks/week? No     Reviewed orders with patient.  Reviewed health maintenance and updated orders accordingly - Yes    Breast Cancer Screenin/30/2021     7:01 AM   Breast CA Risk Assessment (FHS-7)   Do you have a family history of breast, colon, or ovarian cancer? No / Unknown         Mammogram Screening: Recommended mammography every 1-2 years with patient discussion and risk factor consideration  Pertinent mammograms are reviewed under the imaging tab.    History of abnormal Pap smear: NO - age 30-65 PAP every 5 years with negative HPV co-testing recommended      Latest Ref Rng & Units 2019    10:15 AM 2019     9:25 AM   PAP / HPV   PAP (Historical)   NIL    HPV 16 DNA NEG^Negative Negative     HPV 18 DNA NEG^Negative Negative     Other HR HPV NEG^Negative Negative       Reviewed and updated as needed this visit by clinical staff   Tobacco  Allergies  Meds              Reviewed and updated as needed this visit by Provider                   Review of Systems   Constitutional:  Negative for chills and fever.   HENT:  Negative for congestion, ear pain, hearing loss and sore throat.    Eyes:  Negative for pain and visual disturbance.   Respiratory:  Positive for shortness of breath. Negative for cough.    Cardiovascular:  Positive for peripheral edema. Negative for chest pain and palpitations.   Gastrointestinal:  Positive for heartburn. Negative for abdominal pain, constipation, diarrhea, hematochezia and nausea.   Breasts:  Negative for tenderness, breast mass and discharge.   Genitourinary:  Negative for dysuria, frequency, genital sores, hematuria, pelvic pain, urgency, vaginal bleeding and vaginal discharge.   Musculoskeletal:  Positive for arthralgias. Negative for joint swelling and myalgias.   Skin:  Negative for rash.   Neurological:  Positive for dizziness and headaches. Negative for weakness and paresthesias.   Psychiatric/Behavioral:   "Negative for mood changes. The patient is not nervous/anxious.         OBJECTIVE:   /74   Pulse 91   Temp 98.1  F (36.7  C) (Tympanic)   Resp 16   Ht 1.689 m (5' 6.5\")   Wt 93 kg (205 lb)   LMP 01/01/2002 (Approximate)   SpO2 94%   BMI 32.59 kg/m    Physical Exam  GENERAL: healthy, alert and no distress  EYES: Eyes grossly normal to inspection, PERRL and conjunctivae and sclerae normal  HENT: ear canals and TM's normal, nose and mouth without ulcers or lesions  NECK: no adenopathy, no asymmetry, masses, or scars and thyroid normal to palpation  RESP: lungs clear to auscultation - no rales, rhonchi or wheezes. Slightly tight but no wheezing. No resp distress.  CV: regular rate and rhythm, normal S1 S2, no S3 or S4, no murmur, click or rub, no peripheral edema and peripheral pulses strong  ABDOMEN: soft, nontender, no hepatosplenomegaly, no masses and bowel sounds normal  MS: no gross musculoskeletal defects noted, no edema  SKIN: no suspicious lesions or rashes  NEURO: Normal strength and tone, mentation intact and speech normal  PSYCH: mentation appears normal, affect normal/bright    Diagnostic Test Results:  Labs reviewed in Epic    ASSESSMENT/PLAN:   (Z00.00) Routine general medical examination at a health care facility  (primary encounter diagnosis)  - declines mammo this year, maybe next year  - colon ordered given tubular adenoma (f/up was supposed to be in 2022) - she isn't sure about repeating this.  - declines vaccines today  Plan: TSH with free T4 reflex          (R06.2) Wheezing  Had normal spirometry but did improve with flovent/albuterol. Only used seasonally. Does have history of smoking but clinically doesn't have copd. Will change to SMART therapy - if not covered will go back to flovent/albuterol.   Plan: budesonide-formoterol (SYMBICORT) 80-4.5         MCG/ACT Inhaler          (R06.02) Shortness of breath  Likely related to above but with slightly lower O2 will check.   Plan: " "Symptomatic COVID-19 Virus (Coronavirus) by PCR    (R06.83) Snoring  Comment: Noted by anesthesia during gallbladder and colonosocpy.  Plan: Adult Sleep Eval & Management          Referral          (Z68.32) BMI 32.0-32.9,adult  Plan: Comprehensive metabolic panel (BMP + Alb, Alk         Phos, ALT, AST, Total. Bili, TP)    (Z13.1) Screening for diabetes mellitus  Plan: Hemoglobin A1c, Comprehensive metabolic panel         (BMP + Alb, Alk Phos, ALT, AST, Total. Bili,         TP)    (Z13.220) Screening cholesterol level  Plan: Lipid panel reflex to direct LDL Fasting    (Z12.11) Screen for colon cancer  Plan: Colonoscopy Screening  Referral      COUNSELING:  Reviewed preventive health counseling, as reflected in patient instructions      BMI:   Estimated body mass index is 32.59 kg/m  as calculated from the following:    Height as of this encounter: 1.689 m (5' 6.5\").    Weight as of this encounter: 93 kg (205 lb).       She reports that she quit smoking about 6 years ago. Her smoking use included cigarettes. She started smoking about 45 years ago. She smoked an average of .5 packs per day. She uses smokeless tobacco.        Cesar Jean MD  [facility] MICHAEL  "

## 2023-11-16 NOTE — PATIENT INSTRUCTIONS
"Good to see you today!    Breathing - new inhaler sent. If you notice seasonal symptoms I would take it 2x daily during those seasons. Can also use it as a \"rescue\" if needed. Let me know if you're using the \"rescue\" more than 2x a month because I would adjust the dose. If this is super expensive please call my office and we can go back to the Flovent + albuterol.     Regular labs - I'll be in touch w/ results next week.    Deferring mammogram to next year (but if you change your mind can call anytime)    Colonoscopy - we should do this based on the size of the polyp they found last time. I pushed it out to the spring. They will call you to schedule.     Sleep study - referral placed and they will call you to schedule. I do think we should do this if 2 different people have questioned it when you had anesthesia.     Preventive Health Recommendations  Female Ages 50 - 64    Yearly exam: See your health care provider every year in order to  Review health changes.   Discuss preventive care.    Review your medicines if your doctor has prescribed any.    Get a Pap test every three years (unless you have an abnormal result and your provider advises testing more often).  If you get Pap tests with HPV test, you only need to test every 5 years, unless you have an abnormal result.   You do not need a Pap test if your uterus was removed (hysterectomy) and you have not had cancer.  You should be tested each year for STDs (sexually transmitted diseases) if you're at risk.   Have a mammogram every 1 to 2 years.  Have a colonoscopy at age 45, or have a yearly FIT test (stool test). These exams screen for colon cancer.    Have a cholesterol test every 5 years, or more often if advised.  Have a diabetes test (fasting glucose) every three years. If you are at risk for diabetes, you should have this test more often.   If you are at risk for osteoporosis (brittle bone disease), think about having a bone density scan (DEXA).    Shots: " Get a flu shot each year. Get a tetanus shot every 10 years.    Nutrition:   Eat at least 5 servings of fruits and vegetables each day.  Eat whole-grain bread, whole-wheat pasta and brown rice instead of white grains and rice.  Get adequate Calcium and Vitamin D.     Lifestyle  Exercise at least 150 minutes a week (30 minutes a day, 5 days a week). This will help you control your weight and prevent disease.  Limit alcohol to one drink per day.  No smoking.   Wear sunscreen to prevent skin cancer.   See your dentist every six months for an exam and cleaning.  See your eye doctor every 1 to 2 years.

## 2023-11-16 NOTE — COMMUNITY RESOURCES LIST (ENGLISH)
11/16/2023   [facility]  N/A  For additional resource needs, please contact your health insurance member services or your primary care team.  Phone: [phone]   Email: N/A   Address: [facility] [place] [address]   Hours: N/A        Financial Stability       Utility payment assistance  1  [place] [facility] Distance: 14.23 miles      In-Person, Phone/Virtual   [address] [place] [address]  Language: English  Hours: Mon - Fri 8:30 AM - 4:30 PM  Fees: Free   Phone: (659) 340-1497 Website: https://[place].gov/[employer]/energy/consumer-assistance/energy-assistance-program/     2  [place] Public [employer] [facility] [place]'s Telephone Assistance Plan (TAP) and [facility] Lifeline and Affordable Connectivity Program (ACP) Distance: 19.69 miles      Phone/Virtual   [address] [place] [address]  Language: English  Fees: Free   Phone: (297) 922-2283 Email: [email or web address].[email or web address]@[email or web address]. Website: https://[place]/puc/consumers/telephone/          Important Numbers & Websites       [facility]   211 211unitedway.org  Poison Control   (563) 217-8912 [place]po[place]  Suicide and Crisis Lifeline   [address] [place], [place] [address]line.org  Childhelp [place] Child Abuse Hotline   534.266.7146 Childhelphotline.org  National Sexual Assault Hotline   (524) 172-1928 (HOPE) Rainn.org  National Runaway Safeline   (266) 159-6613 (RUNAWAY) 1800runaway.org  Pregnancy & Postpartum Support [place]   Call/text 433-983-0059 Ppsupportmn.org  Substance Abuse National Helpline ([facility]   432-554-HELP (2300) Findtreatment.gov  Emergency Services   911

## 2023-11-17 PROBLEM — E55.9 VITAMIN D DEFICIENCY: Status: ACTIVE | Noted: 2023-11-17

## 2023-11-17 LAB
ALBUMIN SERPL BCG-MCNC: 4.4 G/DL (ref 3.5–5.2)
ALP SERPL-CCNC: 101 U/L (ref 40–150)
ALT SERPL W P-5'-P-CCNC: 32 U/L (ref 0–50)
ANION GAP SERPL CALCULATED.3IONS-SCNC: 11 MMOL/L (ref 7–15)
AST SERPL W P-5'-P-CCNC: 21 U/L (ref 0–45)
BILIRUB SERPL-MCNC: 0.2 MG/DL
BUN SERPL-MCNC: 11 MG/DL (ref 8–23)
CALCIUM SERPL-MCNC: 9.4 MG/DL (ref 8.8–10.2)
CHLORIDE SERPL-SCNC: 100 MMOL/L (ref 98–107)
CHOLEST SERPL-MCNC: 263 MG/DL
CREAT SERPL-MCNC: 0.77 MG/DL (ref 0.51–0.95)
DEPRECATED HCO3 PLAS-SCNC: 28 MMOL/L (ref 22–29)
EGFRCR SERPLBLD CKD-EPI 2021: 87 ML/MIN/1.73M2
GLUCOSE SERPL-MCNC: 102 MG/DL (ref 70–99)
HDLC SERPL-MCNC: 46 MG/DL
LDLC SERPL CALC-MCNC: 170 MG/DL
NONHDLC SERPL-MCNC: 217 MG/DL
POTASSIUM SERPL-SCNC: 4.2 MMOL/L (ref 3.4–5.3)
PROT SERPL-MCNC: 7.2 G/DL (ref 6.4–8.3)
SARS-COV-2 RNA RESP QL NAA+PROBE: NEGATIVE
SODIUM SERPL-SCNC: 139 MMOL/L (ref 135–145)
TRIGL SERPL-MCNC: 234 MG/DL
TSH SERPL DL<=0.005 MIU/L-ACNC: 1.25 UIU/ML (ref 0.3–4.2)
VIT D+METAB SERPL-MCNC: 14 NG/ML (ref 20–50)

## 2023-12-17 ENCOUNTER — HEALTH MAINTENANCE LETTER (OUTPATIENT)
Age: 63
End: 2023-12-17

## 2024-04-15 ENCOUNTER — HOSPITAL ENCOUNTER (EMERGENCY)
Facility: CLINIC | Age: 64
Discharge: HOME | End: 2024-04-15
Attending: EMERGENCY MEDICINE | Admitting: EMERGENCY MEDICINE
Payer: COMMERCIAL

## 2024-04-15 ENCOUNTER — OFFICE VISIT (OUTPATIENT)
Dept: URGENT CARE | Facility: URGENT CARE | Age: 64
End: 2024-04-15
Payer: COMMERCIAL

## 2024-04-15 VITALS
WEIGHT: 205 LBS | TEMPERATURE: 97.2 F | OXYGEN SATURATION: 97 % | SYSTOLIC BLOOD PRESSURE: 140 MMHG | HEART RATE: 82 BPM | RESPIRATION RATE: 16 BRPM | DIASTOLIC BLOOD PRESSURE: 92 MMHG | BODY MASS INDEX: 32.59 KG/M2

## 2024-04-15 VITALS
HEIGHT: 66 IN | BODY MASS INDEX: 34.69 KG/M2 | WEIGHT: 215.83 LBS | TEMPERATURE: 97.9 F | DIASTOLIC BLOOD PRESSURE: 79 MMHG | OXYGEN SATURATION: 96 % | RESPIRATION RATE: 18 BRPM | SYSTOLIC BLOOD PRESSURE: 131 MMHG | HEART RATE: 81 BPM

## 2024-04-15 DIAGNOSIS — B02.31 HERPES ZOSTER CONJUNCTIVITIS: ICD-10-CM

## 2024-04-15 DIAGNOSIS — R23.8 VESICULAR RASH: Primary | ICD-10-CM

## 2024-04-15 PROCEDURE — 99214 OFFICE O/P EST MOD 30 MIN: CPT | Performed by: NURSE PRACTITIONER

## 2024-04-15 PROCEDURE — 99284 EMERGENCY DEPT VISIT MOD MDM: CPT

## 2024-04-15 PROCEDURE — 250N000013 HC RX MED GY IP 250 OP 250 PS 637: Performed by: EMERGENCY MEDICINE

## 2024-04-15 PROCEDURE — 250N000012 HC RX MED GY IP 250 OP 636 PS 637: Performed by: EMERGENCY MEDICINE

## 2024-04-15 RX ORDER — PREDNISONE 20 MG/1
60 TABLET ORAL ONCE
Status: COMPLETED | OUTPATIENT
Start: 2024-04-15 | End: 2024-04-15

## 2024-04-15 RX ORDER — POLYMYXIN B SULFATE AND TRIMETHOPRIM 1; 10000 MG/ML; [USP'U]/ML
1-2 SOLUTION OPHTHALMIC EVERY 4 HOURS
Qty: 10 ML | Refills: 0 | Status: SHIPPED | OUTPATIENT
Start: 2024-04-15 | End: 2024-04-15

## 2024-04-15 RX ORDER — VALACYCLOVIR HYDROCHLORIDE 1 G/1
1000 TABLET, FILM COATED ORAL 3 TIMES DAILY
Qty: 21 TABLET | Refills: 0 | Status: SHIPPED | OUTPATIENT
Start: 2024-04-15 | End: 2024-04-22

## 2024-04-15 RX ORDER — TETRACAINE HYDROCHLORIDE 5 MG/ML
SOLUTION OPHTHALMIC
Status: DISCONTINUED
Start: 2024-04-15 | End: 2024-04-15 | Stop reason: HOSPADM

## 2024-04-15 RX ORDER — PREDNISONE 20 MG/1
TABLET ORAL
Qty: 10 TABLET | Refills: 0 | Status: SHIPPED | OUTPATIENT
Start: 2024-04-15 | End: 2024-04-16

## 2024-04-15 RX ORDER — VALACYCLOVIR HYDROCHLORIDE 1 G/1
1000 TABLET, FILM COATED ORAL ONCE
Status: COMPLETED | OUTPATIENT
Start: 2024-04-15 | End: 2024-04-15

## 2024-04-15 RX ORDER — METOCLOPRAMIDE HYDROCHLORIDE 5 MG/ML
10 INJECTION INTRAMUSCULAR; INTRAVENOUS ONCE
Status: DISCONTINUED | OUTPATIENT
Start: 2024-04-15 | End: 2024-04-15

## 2024-04-15 RX ADMIN — PREDNISONE 60 MG: 20 TABLET ORAL at 19:01

## 2024-04-15 RX ADMIN — VALACYCLOVIR HYDROCHLORIDE 1000 MG: 1 TABLET, FILM COATED ORAL at 19:01

## 2024-04-15 ASSESSMENT — COLUMBIA-SUICIDE SEVERITY RATING SCALE - C-SSRS
2. HAVE YOU ACTUALLY HAD ANY THOUGHTS OF KILLING YOURSELF IN THE PAST MONTH?: NO
1. IN THE PAST MONTH, HAVE YOU WISHED YOU WERE DEAD OR WISHED YOU COULD GO TO SLEEP AND NOT WAKE UP?: NO
6. HAVE YOU EVER DONE ANYTHING, STARTED TO DO ANYTHING, OR PREPARED TO DO ANYTHING TO END YOUR LIFE?: NO

## 2024-04-15 ASSESSMENT — ACTIVITIES OF DAILY LIVING (ADL): ADLS_ACUITY_SCORE: 33

## 2024-04-15 NOTE — PROGRESS NOTES
Assessment & Plan     Vesicular rash    Suspect zoster, concern with ocular involvement.    Recommend ophthalmology evaluation ASAP.  Unable to get this in UC setting     Recommend she follow up in ER for emergent ophthalmology eval.          Return in about 1 week (around 4/22/2024) for with regular provider if symptoms persist.    DIGNA Morataya CNP  [facility] MICHAEL Brink is a 63 year old female who presents to clinic today for the following health issues:  Chief Complaint   Patient presents with    Eye Problem     R eye is red and irritated, swollen R side of face, Pt states these sx's started 2 days ago-advil is not helping     HPI    Rash    Onset of rash was 3 day(s) ago.   Course of illness is sudden onset and worsening.  Severity moderately severe  Current and Associated symptoms: itching, burning, painful, red, and blistering   Location of the rash: eyelid, face, and forehead.  Previous history of a similar rash? No  Recent exposure history: none known  Denies exposure to: none known  Associated symptoms include: nothing.  Treatment measures tried include: otc hydrocortisone cream  Eye became red and broke out in red rash on forehead the next day.    Rash hurts  Now has lesions into her scalp.      Review of Systems  Constitutional, HEENT, cardiovascular, pulmonary, GI, , musculoskeletal, neuro, skin, endocrine and psych systems are negative, except as otherwise noted.      Objective    BP (!) 140/92   Pulse 82   Temp 97.2  F (36.2  C) (Tympanic)   Resp 16   Wt 93 kg (205 lb)   LMP 01/01/2002 (Approximate)   SpO2 97%   BMI 32.59 kg/m    Physical Exam   GENERAL: alert and no distress  EYES: conjunctiva/corneas- conjunctival injection right with vesicles on eyelid  RESP: lungs clear to auscultation - no rales, rhonchi or wheezes  CV: regular rate and rhythm, normal S1 S2, no S3 or S4, no murmur, click or rub, no peripheral edema  MS: no gross  musculoskeletal defects noted, no edema  SKIN: vesicle - face and forehead and scalp above right eye.

## 2024-04-15 NOTE — ED PROVIDER NOTES
"  History     Chief Complaint:  Eye Problem and Rash       The history is provided by the patient.      Keena Jorge is a 63 year old female who presents with right eye problem and rash. On Friday night, she thought she had an eye lash in her right eye and kept rubbing it. On Saturday morning, she woke with her right eye swollen. She has been having a headache, right eye swelling, and a rash on her right eye and right scalp. She was UC earlier today and was advised to come int the ED for concern of shingles.    Independent Historian:   None - Patient Only    Review of External Notes:         Medications:    Symbicort    Past Medical History:    Tobacco use  Aneurysm of left internal carotid artery     Past Surgical History:    Cholecystectomy  Tonsillectomy       Physical Exam   Patient Vitals for the past 24 hrs:   BP Temp Temp src Pulse Resp SpO2 Height Weight   04/15/24 1631 (!) 188/113 97.9  F (36.6  C) Temporal 81 18 96 % 1.676 m (5' 6\") 97.9 kg (215 lb 13.3 oz)        Physical Exam  Vitals reviewed.   HENT:      Head: Normocephalic.      Right Ear: Tympanic membrane normal.      Nose: Nose normal.      Mouth/Throat:      Mouth: Mucous membranes are moist.   Eyes:      Pupils: Pupils are equal, round, and reactive to light.      Comments: Mild injection of the right conjunctivomild swelling of the right upper eyelid exam does demonstrate uptake on the lateral cornea consistent with dendritic lesions.   Cardiovascular:      Rate and Rhythm: Normal rate.   Pulmonary:      Effort: Pulmonary effort is normal.   Skin:     General: Skin is warm.      Comments: There is a rash over the V1 distribution of the right forehead consistent with likely shingles or zoster   Neurological:      Mental Status: She is alert.             Emergency Department Course     Imaging:  No orders to display     Laboratory:  Labs Ordered and Resulted from Time of ED Arrival to Time of ED Departure - No data to display       Emergency " Department Course & Assessments:    Interventions:  Medications   fluorescein (FUL-CARLEE) ophthalmic strip 1 strip (has no administration in time range)   tetracaine (PONTOCAINE) 0.5 % ophthalmic solution (has no administration in time range)   valACYclovir (VALTREX) tablet 1,000 mg (1,000 mg Oral $Given 4/15/24 1901)   predniSONE (DELTASONE) tablet 60 mg (60 mg Oral $Given 4/15/24 1901)      Assessments:  1839 I examined the patient and obtained history as noted above.     Independent Interpretation (X-rays, CTs, rhythm strip):  None    Consultations/Discussion of Management or Tests:  None        Social Determinants of Health affecting care:   None    Disposition:  The patient was discharged.       Impression & Plan       Medical Decision Making:  Patient presents with right V1 shingles fluorescein exam is positive for dendritic lesions.  Discussed with patient care encouraged semiurgent follow-up as an up with an ophthalmologist at discharge on oral acyclovir and oral patient discharged home in stable condition      Diagnosis:    ICD-10-CM    1. Herpes zoster conjunctivitis  B02.31            Discharge Medications:  New Prescriptions    PREDNISONE (DELTASONE) 20 MG TABLET    Take three  tablets (= 60mg) each day for seven days    VALACYCLOVIR (VALTREX) 1000 MG TABLET    Take 1 tablet (1,000 mg) by mouth 3 times daily for 7 days        Scribe Disclosure:  I, Juan Pablo Singer, am serving as a scribe at 6:44 PM on 4/15/2024 to document services personally performed by Douglas Mtz MD based on my observations and the provider's statements to me.     4/15/2024   Douglas Mtz MD Goodman, Brian Samuel, MD  04/16/24 0106

## 2024-04-15 NOTE — ED TRIAGE NOTES
Presents to ED from . Patient was seen at  with c/o R eye pain, swelling, and redness, as well as rash on R side of the face and R scalp, and stabbing headache pain on R side of head.  referred patient to ED for concern of shingles.

## 2024-04-15 NOTE — PATIENT INSTRUCTIONS
No results found for any visits on 04/15/24.    Polytrim every 4 hours during the day.    Lots of handwashing.    Follow up if persists or worsens.

## 2024-04-16 ENCOUNTER — TELEPHONE (OUTPATIENT)
Dept: PEDIATRICS | Facility: CLINIC | Age: 64
End: 2024-04-16
Payer: COMMERCIAL

## 2024-04-16 RX ORDER — PREDNISONE 20 MG/1
TABLET ORAL
Qty: 11 TABLET | Refills: 0 | Status: SHIPPED | OUTPATIENT
Start: 2024-04-16

## 2024-04-16 NOTE — TELEPHONE ENCOUNTER
Patient's spouse sent the following [employer] message in his chart:    My wife Keena Jorge is currently registered at the [facility].  The provider she most often sees is a pediatrician. She is seeking a permanent provider and would like to see Marcos Acosta.     Currently she has a medical issue.  She has omi having terrible headaches for the past week.  Her right eye became swollen Friday and yesterday she had swelling on the side of her right cheek.  She describes the headache pain as a stabbing pain on the right side of her head, accruing multiple times per minute.        Responded to spouse with the following message:    It looks like your wife was seen yesterday. In the future I would encourage you or your wife to call with more urgent concerns as [employer] messages can take up to 3 business days to be reviewed and are not monitored on the weekends. We also ask that you not use your own [employer] account for messages regarding your wife due to HIPAA laws. If your wife would like to be seen in our clinic I would encourage you or your wife to call to schedule the next available appointment to establish care.

## 2024-04-16 NOTE — DISCHARGE INSTRUCTIONS
The distribution of your rash is likely shingles.  We do notice a couple areas of uptake of dye on the very lateral aspect of your cornea.  This needs to be reassessed by an ophthalmologist.  Use steroid and antiviral medication.  Please follow-up with ophthalmology in the next 3 to 5 days for reassessment of the eye.

## 2024-10-14 ENCOUNTER — TELEPHONE (OUTPATIENT)
Dept: PEDIATRICS | Facility: CLINIC | Age: 64
End: 2024-10-14
Payer: COMMERCIAL

## 2024-10-14 DIAGNOSIS — R06.2 WHEEZING: Primary | ICD-10-CM

## 2024-10-14 RX ORDER — ALBUTEROL SULFATE 90 UG/1
2 INHALANT RESPIRATORY (INHALATION) EVERY 6 HOURS PRN
Qty: 18 G | Refills: 3 | Status: SHIPPED | OUTPATIENT
Start: 2024-10-14

## 2024-10-14 RX ORDER — FLUTICASONE PROPIONATE AND SALMETEROL 100; 50 UG/1; UG/1
1 POWDER RESPIRATORY (INHALATION) EVERY 12 HOURS
Qty: 180 EACH | Refills: 3 | Status: SHIPPED | OUTPATIENT
Start: 2024-10-14

## 2024-10-14 NOTE — TELEPHONE ENCOUNTER
Have to change from SMART therapy to controller + rescue. Please review w/ pt. Okay to take controller seasonally.

## 2024-10-14 NOTE — TELEPHONE ENCOUNTER
Fax received from [email or web address].    Drug: Budesonide-Formoterol 80-4.5    Alternative Requested: Please send new RX for Wixela S per insurance coverage for 90 days supply.    Kecia Gates on 10/14/2024 at 3:20 PM

## 2024-10-15 NOTE — TELEPHONE ENCOUNTER
Notified Patient  of provider's message.     Person was given an opportunity to ask questions, verbalized understanding of plan, and is agreeable.     Kecia Serna RN

## 2024-10-17 ENCOUNTER — PATIENT OUTREACH (OUTPATIENT)
Dept: CARE COORDINATION | Facility: CLINIC | Age: 64
End: 2024-10-17
Payer: COMMERCIAL

## 2024-10-31 ENCOUNTER — PATIENT OUTREACH (OUTPATIENT)
Dept: CARE COORDINATION | Facility: CLINIC | Age: 64
End: 2024-10-31
Payer: COMMERCIAL

## 2025-01-13 SDOH — HEALTH STABILITY: PHYSICAL HEALTH: ON AVERAGE, HOW MANY DAYS PER WEEK DO YOU ENGAGE IN MODERATE TO STRENUOUS EXERCISE (LIKE A BRISK WALK)?: 0 DAYS

## 2025-01-13 SDOH — HEALTH STABILITY: PHYSICAL HEALTH: ON AVERAGE, HOW MANY MINUTES DO YOU ENGAGE IN EXERCISE AT THIS LEVEL?: 0 MIN

## 2025-01-13 ASSESSMENT — SOCIAL DETERMINANTS OF HEALTH (SDOH): HOW OFTEN DO YOU GET TOGETHER WITH FRIENDS OR RELATIVES?: NEVER

## 2025-01-15 ENCOUNTER — OFFICE VISIT (OUTPATIENT)
Dept: INTERNAL MEDICINE | Facility: CLINIC | Age: 65
End: 2025-01-15
Payer: COMMERCIAL

## 2025-01-15 VITALS
TEMPERATURE: 98.2 F | WEIGHT: 207.5 LBS | DIASTOLIC BLOOD PRESSURE: 84 MMHG | HEIGHT: 67 IN | OXYGEN SATURATION: 93 % | HEART RATE: 91 BPM | SYSTOLIC BLOOD PRESSURE: 159 MMHG | BODY MASS INDEX: 32.57 KG/M2 | RESPIRATION RATE: 16 BRPM

## 2025-01-15 DIAGNOSIS — Z53.20 MAMMOGRAM DECLINED: ICD-10-CM

## 2025-01-15 DIAGNOSIS — I10 PRIMARY HYPERTENSION: ICD-10-CM

## 2025-01-15 DIAGNOSIS — R73.03 PREDIABETES: ICD-10-CM

## 2025-01-15 DIAGNOSIS — Z53.20 PAP SMEAR OF CERVIX DECLINED: ICD-10-CM

## 2025-01-15 DIAGNOSIS — E55.9 VITAMIN D DEFICIENCY: ICD-10-CM

## 2025-01-15 DIAGNOSIS — E78.2 MIXED HYPERLIPIDEMIA: ICD-10-CM

## 2025-01-15 DIAGNOSIS — Z53.20 COLONOSCOPY REFUSED: ICD-10-CM

## 2025-01-15 DIAGNOSIS — K76.0 HEPATIC STEATOSIS: ICD-10-CM

## 2025-01-15 DIAGNOSIS — Z00.00 ROUTINE HISTORY AND PHYSICAL EXAMINATION OF ADULT: Primary | ICD-10-CM

## 2025-01-15 LAB
EST. AVERAGE GLUCOSE BLD GHB EST-MCNC: 143 MG/DL
HBA1C MFR BLD: 6.6 % (ref 0–5.6)
HGB BLD-MCNC: 14 G/DL (ref 11.7–15.7)

## 2025-01-15 PROCEDURE — 80061 LIPID PANEL: CPT | Performed by: INTERNAL MEDICINE

## 2025-01-15 PROCEDURE — 36415 COLL VENOUS BLD VENIPUNCTURE: CPT | Performed by: INTERNAL MEDICINE

## 2025-01-15 PROCEDURE — 99214 OFFICE O/P EST MOD 30 MIN: CPT | Mod: 25 | Performed by: INTERNAL MEDICINE

## 2025-01-15 PROCEDURE — 83036 HEMOGLOBIN GLYCOSYLATED A1C: CPT | Performed by: INTERNAL MEDICINE

## 2025-01-15 PROCEDURE — 82306 VITAMIN D 25 HYDROXY: CPT | Performed by: INTERNAL MEDICINE

## 2025-01-15 PROCEDURE — 85018 HEMOGLOBIN: CPT | Performed by: INTERNAL MEDICINE

## 2025-01-15 PROCEDURE — 99396 PREV VISIT EST AGE 40-64: CPT | Performed by: INTERNAL MEDICINE

## 2025-01-15 PROCEDURE — 80053 COMPREHEN METABOLIC PANEL: CPT | Performed by: INTERNAL MEDICINE

## 2025-01-15 RX ORDER — HYDROCHLOROTHIAZIDE 25 MG/1
25 TABLET ORAL DAILY
Qty: 40 TABLET | Refills: 0 | Status: SHIPPED | OUTPATIENT
Start: 2025-01-15

## 2025-01-15 RX ORDER — HYDROCHLOROTHIAZIDE 25 MG/1
25 TABLET ORAL DAILY
Qty: 30 TABLET | Refills: 0 | Status: SHIPPED | OUTPATIENT
Start: 2025-01-15 | End: 2025-01-15

## 2025-01-15 NOTE — PROGRESS NOTES
Preventive Care Visit  [facility]  Sumit Rodríguez MD, Internal Medicine  Jordy 15, 2025      Assessment & Plan     (Z00.00) Routine history and physical examination of adult  (primary encounter diagnosis)  Plan: Hemoglobin, Comprehensive metabolic panel            (I10) Primary hypertension  Plan: Discussed sodium restriction, maintaining ideal body weight and regular exercise program as physiologic means to achieve blood pressure control. The patient will strive towards this. Meanwhile, it is appropriate to lower BP with medications, while observing for therapeutic effect and if appropriate later, can discontinue medications if physiologic methods appear to be effective. The patient indicates understanding of these issues and agrees with the plan. Started on hydrochlorothiazide (HYDRODIURIL) 25 MG tablet,explained clearly about the medication,insructions and side effects.   Continue home readings and return in 1 month .       (E55.9) Vitamin D deficiency  Plan: Not taking any vitamin D supplements at this time, check Vitamin D level today            (R73.03) Prediabetes  Plan: Hemoglobin A1c, advised to follow ADA diet and regular exercise            (E78.2) Mixed hyperlipidemia  Plan: Currently not on any medications at this time, patient states that she tried statin in the past and had nausea so she stopped taking it, check lipid panel reflex to direct LDL Fasting and discussed medications after results .            (Z53.20) Colonoscopy refused- Patient refused colon cancer screening    (Z53.20) Mammogram declined     (Z53.20) Pap smear of cervix declined        (K76.0) Hepatic steatosis  Plan: Advised to follow low-fat diet and regular exercise, avoid Alcohol and Tylenol       Patient has been advised of split billing requirements and indicates understanding: Yes        BMI  Estimated body mass index is 32.74 kg/m  as calculated from the following:    Height as of this  "encounter: 1.695 m (5' 6.75\").    Weight as of this encounter: 94.1 kg (207 lb 8 oz).   Weight management plan: Discussed healthy diet and exercise guidelines    Counseling  Appropriate preventive services were addressed with this patient via screening, questionnaire, or discussion as appropriate         Subjective   Keena is a 64 year old, presenting for the following:  Physical        1/15/2025     9:13 AM   Additional Questions   Roomed by ARELY   Accompanied by SELF         1/15/2025     9:13 AM   Patient Reported Additional Medications   Patient reports taking the following new medications NONE          HPI     Hyperlipidemia Follow-Up    Are you regularly taking any medication or supplement to lower your cholesterol?   No  Are you having muscle aches or other side effects that you think could be caused by your cholesterol lowering medication?  NA    Prediabetes follow up -  last A1c 6.3    H/o fatty liver , last CT -12/2022- significant hepatic steatosis    Patient declines mammogram, Pap smear, colon screening and recommended immunizations at this time       Health Care Directive  Patient does not have a Health Care Directive: Discussed advance care planning with patient; information given to patient to review.      1/13/2025   General Health   How would you rate your overall physical health? Good   Feel stress (tense, anxious, or unable to sleep) Not at all         1/13/2025   Nutrition   Three or more servings of calcium each day? Yes   Diet: Regular (no restrictions)   How many servings of fruit and vegetables per day? (!) 0-1   How many sweetened beverages each day? 0-1         1/13/2025   Exercise   Days per week of moderate/strenous exercise 0 days   Average minutes spent exercising at this level 0 min   (!) EXERCISE CONCERN      1/13/2025   Social Factors   Frequency of gathering with friends or relatives Never   Worry food won't last until get money to buy more No   Food not last or not have enough " money for food? No   Do you have housing? (Housing is defined as stable permanent housing and does not include staying ouside in a car, in a tent, in an abandoned building, in an overnight shelter, or couch-surfing.) Yes   Are you worried about losing your housing? No   Lack of transportation? No   Unable to get utilities (heat,electricity)? No   (!) SOCIAL CONNECTIONS CONCERN      1/15/2025   Fall Risk   Fallen 2 or more times in the past year? No   Trouble with walking or balance? No          2025   Dental   Dentist two times every year? (!) NO         2025   TB Screening   Were you born outside of the [place]? No         Today's PHQ-2 Score:       1/15/2025     7:52 AM   PHQ-2 (  [employer])   Q1: Little interest or pleasure in doing things 0   Q2: Feeling down, depressed or hopeless 0   PHQ-2 Score 0    Q1: Little interest or pleasure in doing things Not at all   Q2: Feeling down, depressed or hopeless Not at all   PHQ-2 Score 0       Patient-reported           2025   Substance Use   Alcohol more than 3/day or more than 7/wk No   Do you use any other substances recreationally? No     Social History     Tobacco Use    Smoking status: Former     Current packs/day: 0.00     Average packs/day: 0.5 packs/day for 38.3 years (19.2 ttl pk-yrs)     Types: Cigarettes     Start date: 1978     Quit date: 2016     Years since quittin.0    Smokeless tobacco: Current    Tobacco comments:     Vape occasional   Vaping Use    Vaping status: Some Days    Substances: Nicotine, Flavoring, mostly only 1% nicotine in it   Substance Use Topics    Alcohol use: Yes     Comment: occasional    Drug use: No          Pt Declines mammogram    Pt Declines colonoscopy     Pt Declines pap          2025   STI Screening   New sexual partner(s) since last STI/HIV test? No     History of abnormal Pap smear: No - age 30-64 HPV with reflex Pap every 5 years recommended        Latest Ref Rng & Units 2019    10:15 AM  8/9/2019     9:25 AM   PAP / HPV   PAP (Historical)   NIL    HPV 16 DNA NEG^Negative Negative     HPV 18 DNA NEG^Negative Negative     Other HR HPV NEG^Negative Negative               Reviewed and updated as needed this visit by Provider                    Past Medical History:   Diagnosis Date    PONV (postoperative nausea and vomiting)      Past Surgical History:   Procedure Laterality Date    COLONOSCOPY  2020    LAPAROSCOPIC CHOLECYSTECTOMY N/A 1/6/2023    Procedure: LAPAROSCOPIC CHOLECYSTECTOMY;  Surgeon: Henrietta Dobbs MD;  Location: [facility] OR    TONSILLECTOMY      2nd grade     Current Outpatient Medications   Medication Sig Dispense Refill    albuterol (PROAIR HFA/PROVENTIL HFA/VENTOLIN HFA) 108 (90 Base) MCG/ACT inhaler Inhale 2 puffs into the lungs every 6 hours as needed for shortness of breath, wheezing or cough. 18 g 3    fluticasone-salmeterol (ADVAIR) 100-50 MCG/ACT inhaler Inhale 1 puff into the lungs every 12 hours. 180 each 3    hydrochlorothiazide (HYDRODIURIL) 25 MG tablet Take 1 tablet (25 mg) by mouth daily. 40 tablet 0    ibuprofen (ADVIL/MOTRIN) 200 MG tablet Take 3 tablets (600 mg) by mouth every 6 hours as needed for moderate pain (4-6)      valACYclovir (VALTREX) 1000 mg tablet Take 1 tablet (1,000 mg) by mouth 3 times daily for 7 days 21 tablet 0         Review of Systems  CONSTITUTIONAL: NEGATIVE for fever, chills,    EYES: NEGATIVE for vision changes or irritation  ENT/MOUTH: NEGATIVE for ear, mouth and throat problems  RESP: NEGATIVE for significant cough or SOB  BREAST: NEGATIVE for masses, tenderness or discharge  CV: NEGATIVE for chest pain, palpitations or peripheral edema  GI: NEGATIVE for nausea, abdominal pain, heartburn, or change in bowel habits  : NEGATIVE for frequency, dysuria, or hematuria  MUSCULOSKELETAL: NEGATIVE for significant arthralgias or myalgia  NEURO: NEGATIVE for weakness, dizziness or paresthesias  ENDOCRINE: NEGATIVE for temperature intolerance, skin/hair  "changes  HEME: NEGATIVE for bleeding problems  PSYCHIATRIC: NEGATIVE for changes in mood or affect     Objective    Exam  BP (!) 159/84   Pulse 91   Temp 98.2  F (36.8  C) (Tympanic)   Resp 16   Ht 1.695 m (5' 6.75\")   Wt 94.1 kg (207 lb 8 oz)   LMP 01/01/2002 (Approximate)   SpO2 93%   BMI 32.74 kg/m     Estimated body mass index is 32.74 kg/m  as calculated from the following:    Height as of this encounter: 1.695 m (5' 6.75\").    Weight as of this encounter: 94.1 kg (207 lb 8 oz). BP x 3 - 160/100, 159/84 ,152/94    Physical Exam  GENERAL: alert and no distress  EYES: Eyes grossly normal to inspection, PERRL and conjunctivae and sclerae normal  HENT: ear canals and TM's normal, nose and mouth without ulcers or lesions  RESP: lungs clear to auscultation - no rales, rhonchi or wheezes  BREAST: normal without masses, tenderness or nipple discharge and no palpable axillary masses or adenopathy  CV: regular rate and rhythm, normal S1 S2,    ABDOMEN: soft, nontender, bowel sounds normal  MS: Trace LE edema, no calf tenderness   NEURO: Normal strength and tone, mentation intact and speech normal  PSYCH: mentation appears normal, affect normal/bright        Signed Electronically by: Sumit Rodríguez MD    "

## 2025-01-15 NOTE — NURSING NOTE
"BP (!) 152/94   Pulse 91   Temp 98.2  F (36.8  C) (Tympanic)   Resp 16   Ht 1.695 m (5' 6.75\")   Wt 94.1 kg (207 lb 8 oz)   LMP 01/01/2002 (Approximate)   SpO2 93%   BMI 32.74 kg/m      "

## 2025-01-16 LAB
ALBUMIN SERPL BCG-MCNC: 4.1 G/DL (ref 3.5–5.2)
ALP SERPL-CCNC: 92 U/L (ref 40–150)
ALT SERPL W P-5'-P-CCNC: 26 U/L (ref 0–50)
ANION GAP SERPL CALCULATED.3IONS-SCNC: 10 MMOL/L (ref 7–15)
AST SERPL W P-5'-P-CCNC: 19 U/L (ref 0–45)
BILIRUB SERPL-MCNC: 0.3 MG/DL
BUN SERPL-MCNC: 9 MG/DL (ref 8–23)
CALCIUM SERPL-MCNC: 9.5 MG/DL (ref 8.8–10.4)
CHLORIDE SERPL-SCNC: 104 MMOL/L (ref 98–107)
CHOLEST SERPL-MCNC: 225 MG/DL
CREAT SERPL-MCNC: 0.71 MG/DL (ref 0.51–0.95)
EGFRCR SERPLBLD CKD-EPI 2021: >90 ML/MIN/1.73M2
FASTING STATUS PATIENT QL REPORTED: YES
FASTING STATUS PATIENT QL REPORTED: YES
GLUCOSE SERPL-MCNC: 114 MG/DL (ref 70–99)
HCO3 SERPL-SCNC: 25 MMOL/L (ref 22–29)
HDLC SERPL-MCNC: 40 MG/DL
LDLC SERPL CALC-MCNC: 142 MG/DL
NONHDLC SERPL-MCNC: 185 MG/DL
POTASSIUM SERPL-SCNC: 4.2 MMOL/L (ref 3.4–5.3)
PROT SERPL-MCNC: 6.5 G/DL (ref 6.4–8.3)
SODIUM SERPL-SCNC: 139 MMOL/L (ref 135–145)
TRIGL SERPL-MCNC: 217 MG/DL
VIT D+METAB SERPL-MCNC: 17 NG/ML (ref 20–50)

## 2025-02-18 ENCOUNTER — OFFICE VISIT (OUTPATIENT)
Dept: INTERNAL MEDICINE | Facility: CLINIC | Age: 65
End: 2025-02-18
Payer: COMMERCIAL

## 2025-02-18 VITALS
RESPIRATION RATE: 13 BRPM | BODY MASS INDEX: 31.88 KG/M2 | DIASTOLIC BLOOD PRESSURE: 90 MMHG | HEART RATE: 93 BPM | HEIGHT: 67 IN | SYSTOLIC BLOOD PRESSURE: 150 MMHG | OXYGEN SATURATION: 96 % | TEMPERATURE: 97 F | WEIGHT: 203.1 LBS

## 2025-02-18 DIAGNOSIS — I10 PRIMARY HYPERTENSION: Primary | ICD-10-CM

## 2025-02-18 DIAGNOSIS — E66.811 CLASS 1 OBESITY DUE TO EXCESS CALORIES WITH SERIOUS COMORBIDITY AND BODY MASS INDEX (BMI) OF 32.0 TO 32.9 IN ADULT: ICD-10-CM

## 2025-02-18 DIAGNOSIS — E78.2 MIXED HYPERLIPIDEMIA: ICD-10-CM

## 2025-02-18 DIAGNOSIS — E55.9 VITAMIN D DEFICIENCY: ICD-10-CM

## 2025-02-18 DIAGNOSIS — E66.09 CLASS 1 OBESITY DUE TO EXCESS CALORIES WITH SERIOUS COMORBIDITY AND BODY MASS INDEX (BMI) OF 32.0 TO 32.9 IN ADULT: ICD-10-CM

## 2025-02-18 DIAGNOSIS — E11.9 TYPE 2 DIABETES MELLITUS WITHOUT COMPLICATION, WITHOUT LONG-TERM CURRENT USE OF INSULIN (H): ICD-10-CM

## 2025-02-18 PROBLEM — E66.3 OVERWEIGHT (BMI 25.0-29.9): Status: RESOLVED | Noted: 2019-08-09 | Resolved: 2025-02-18

## 2025-02-18 PROBLEM — R73.03 PREDIABETES: Status: RESOLVED | Noted: 2023-11-16 | Resolved: 2025-02-18

## 2025-02-18 LAB
CREAT UR-MCNC: 164 MG/DL
MICROALBUMIN UR-MCNC: <12 MG/L
MICROALBUMIN/CREAT UR: NORMAL MG/G{CREAT}

## 2025-02-18 PROCEDURE — 82570 ASSAY OF URINE CREATININE: CPT | Performed by: INTERNAL MEDICINE

## 2025-02-18 PROCEDURE — G2211 COMPLEX E/M VISIT ADD ON: HCPCS | Performed by: INTERNAL MEDICINE

## 2025-02-18 PROCEDURE — 82043 UR ALBUMIN QUANTITATIVE: CPT | Performed by: INTERNAL MEDICINE

## 2025-02-18 PROCEDURE — 99215 OFFICE O/P EST HI 40 MIN: CPT | Performed by: INTERNAL MEDICINE

## 2025-02-18 RX ORDER — LISINOPRIL AND HYDROCHLOROTHIAZIDE 10; 12.5 MG/1; MG/1
1 TABLET ORAL DAILY
Qty: 31 TABLET | Refills: 1 | Status: SHIPPED | OUTPATIENT
Start: 2025-02-18

## 2025-02-18 RX ORDER — LANCETS
EACH MISCELLANEOUS
Qty: 100 EACH | Refills: 6 | Status: SHIPPED | OUTPATIENT
Start: 2025-02-18

## 2025-02-18 RX ORDER — ROSUVASTATIN CALCIUM 5 MG/1
5 TABLET, COATED ORAL DAILY
Qty: 90 TABLET | Refills: 0 | Status: SHIPPED | OUTPATIENT
Start: 2025-02-18

## 2025-02-18 NOTE — PROGRESS NOTES
Assessment & Plan     (I10) Primary hypertension    Plan: elevated BP,  started on lisinopril-hydrochlorothiazide (ZESTORETIC)  10-12.5 MG tablet 1 tab daily as directed.explained clearly about the medication,insructions and side effects.  Advised to follow low salt diet and exercise and f/u in 1 month       (E11.9) Type 2 diabetes mellitus without complication, without long-term current use of insulin (H)  Plan: New onset diabetes, educated about diabetes, complications,, ADA diet, regular exercise.  Needs  yearly eye exam, patient has a ophthalmologist and will schedule appointment, yearly foot exam.  Also referred to adult Diabetes Education  Referral, check Albumin Random Urine Quantitative with Creat Ratio.  Glucometer and supplies prescribed            (E78.2) Mixed hyperlipidemia  Plan: Discussed lipid results with patient and started on rosuvastatin (CRESTOR) 5 MG tablet as directed.explained clearly about the medication,insructions and side effects.  Continue to follow low-fat diet and regular exercise will repeat lipids in 2-3 months           (E55.9) Vitamin D deficiency  Plan: Has been started on vitamin D 50,000 units once a week, repeat vitamin D level in 3 months      (E66.811,  E66.09,  Z68.32) Class 1 obesity due to excess calories with serious comorbidity and body mass index (BMI) of 32.0 to 32.9 in adult  Plan:  Discussed in detail about Diet,calorie intake,and importance of regular exercise,       Review of the result(s) of each unique test - A1c,lipids  Prescription drug management  40  minutes spent by me on the date of the encounter doing chart review, history and exam, documentation and further activities per the note      The longitudinal plan of care for the diagnosis(es)/condition(s) as documented were addressed during this visit. Due to the added complexity in care, I will continue to support Keena in the subsequent management and with ongoing continuity of care.    Pt  declines- mammogram/pap smear/ colonoscopy and recommended immunizations    Subjective   Keena is a 64 year old, presenting for the following health issues:  Recheck Medication        2/18/2025     8:53 AM   Additional Questions   Roomed by geraldine   Accompanied by self         2/18/2025     8:53 AM   Patient Reported Additional Medications   Patient reports taking the following new medications HTZ     History of Present Illness       Hypertension: She presents for follow up of hypertension.  She does not check blood pressure  regularly outside of the clinic. Outside blood pressures have been over 140/90. She follows a low salt diet.     She eats 0-1 servings of fruits and vegetables daily.She consumes 0 sweetened beverage(s) daily.She exercises with enough effort to increase her heart rate 9 or less minutes per day.  She exercises with enough effort to increase her heart rate 3 or less days per week.   She is taking medications regularly.        Hypertension Follow-up    Do you check your blood pressure regularly outside of the clinic? No  pt was started on hydrochlorothiazide 25 mg tablet once daily at last office visit, patient has been out of medication the last 3 days.    Are you following a low salt diet? Yes  Are your blood pressures ever more than 140 on the top number (systolic) OR more   than 90 on the bottom number (diastolic), for example 140/90? Yes    Hyperlipidemia Follow-Up    Are you regularly taking any medication or supplement to lower your cholesterol?  No-    Are you having muscle aches or other side effects that you think could be caused by your cholesterol lowering medication?  NA    Also here to discuss recent lab results-  A1c 6.6 consistent with New onset diabetes      Vitamin D deficiency -  recent vitamin D is 17-has been started on vitamin D supplements      Past Medical History:   Diagnosis Date    PONV (postoperative nausea and vomiting)     Primary hypertension 1/15/2025  "      Current Outpatient Medications   Medication Sig Dispense Refill    albuterol (PROAIR HFA/PROVENTIL HFA/VENTOLIN HFA) 108 (90 Base) MCG/ACT inhaler Inhale 2 puffs into the lungs every 6 hours as needed for shortness of breath, wheezing or cough. 18 g 3    fluticasone-salmeterol (ADVAIR) 100-50 MCG/ACT inhaler Inhale 1 puff into the lungs every 12 hours. 180 each 3    ibuprofen (ADVIL/MOTRIN) 200 MG tablet Take 3 tablets (600 mg) by mouth every 6 hours as needed for moderate pain (4-6)      lisinopril-hydrochlorothiazide (ZESTORETIC) 10-12.5 MG tablet Take 1 tablet by mouth daily. 31 tablet 1    rosuvastatin (CRESTOR) 5 MG tablet Take 1 tablet (5 mg) by mouth daily. 90 tablet 0    vitamin D3 (CHOLECALCIFEROL) 1.25 MG (77116 UT) capsule Take 1 capsule (50,000 Units) by mouth once a week. 8 capsule 0    valACYclovir (VALTREX) 1000 mg tablet Take 1 tablet (1,000 mg) by mouth 3 times daily for 7 days 21 tablet 0         Review of Systems  CONSTITUTIONAL: NEGATIVE for fever, chills,   RESP: NEGATIVE for significant cough or SOB  CV: NEGATIVE for chest pain, palpitations or peripheral edema  MUSCULOSKELETAL: NEGATIVE for significant arthralgias or myalgia  ENDOCRINE: NEGATIVE for temperature intolerance, skin/hair changes        Objective    BP (!) 150/90   Pulse 93   Temp 97  F (36.1  C) (Tympanic)   Resp 13   Ht 1.695 m (5' 6.75\")   Wt 92.1 kg (203 lb 1.6 oz)   LMP 01/01/2002 (Approximate)   SpO2 96%   BMI 32.05 kg/m    Body mass index is 32.05 kg/m .  Physical Exam   GENERAL: alert and no distress  EYES: Eyes grossly normal to inspection, PERRL and conjunctivae and sclerae normal  RESP: lungs clear to auscultation - no rales, rhonchi or wheezes  CV: regular rate and rhythm, normal S1 S2,  MS: no gross musculoskeletal defects noted, no edema  NEURO: Normal strength and tone, mentation intact and speech normal  Diabetic foot exam: normal DP and PT pulses, no trophic changes or ulcerative lesions, normal " sensory exam, and normal monofilament exam          Signed Electronically by: Sumit Rodríguez MD

## 2025-02-18 NOTE — NURSING NOTE
"BP (!) 140/86   Pulse 93   Temp 97  F (36.1  C) (Tympanic)   Resp 13   Ht 1.695 m (5' 6.75\")   Wt 92.1 kg (203 lb 1.6 oz)   LMP 01/01/2002 (Approximate)   SpO2 96%   BMI 32.05 kg/m      "

## 2025-03-09 DIAGNOSIS — E55.9 VITAMIN D DEFICIENCY: ICD-10-CM

## 2025-03-10 ENCOUNTER — VIRTUAL VISIT (OUTPATIENT)
Dept: EDUCATION SERVICES | Facility: CLINIC | Age: 65
End: 2025-03-10
Attending: INTERNAL MEDICINE
Payer: COMMERCIAL

## 2025-03-10 DIAGNOSIS — E11.9 TYPE 2 DIABETES MELLITUS WITHOUT COMPLICATION, WITHOUT LONG-TERM CURRENT USE OF INSULIN (H): ICD-10-CM

## 2025-03-10 PROCEDURE — 98967 PH1 ASSMT&MGMT NQHP 11-20: CPT | Mod: 93 | Performed by: DIETITIAN, REGISTERED

## 2025-03-10 RX ORDER — METHOCARBAMOL 750 MG/1
1250 TABLET ORAL WEEKLY
Qty: 4 CAPSULE | Refills: 1 | OUTPATIENT
Start: 2025-03-10

## 2025-03-10 NOTE — TELEPHONE ENCOUNTER
This med is for 8 weeks. And she can start taking OTC vit D 2000 units daily and rpt vit D level in 04/25

## 2025-03-10 NOTE — LETTER
3/10/2025         RE: Keena Jorge  [address] [facility] [address]        Dear Colleague,    Thank you for referring your patient, Keena Jorge, to the [facility]. Please see a copy of my visit note below.    Diabetes Self-Management Education & Support    Presents for: Initial Assessment for new diagnosis    Type of Service: Telephone Visit    Originating Location (Patient Location): Home  Distant Location (Provider Location): Offsite  Mode of Communication:  Telephone    Telephone Visit Start Time:  3:35p  Telephone Visit End Time (telephone visit stop time): 3:50p    How would patient like to obtain AVS? MyChart      Assessment  Keena is here for new diagnosis education. She reports she has no questions at time of visit, feeling fine about having diabetes and hasn't really had any symptoms of her diabetes. She eats 1 meal/day, sometimes 2 and has done this her whole life. Doesn't eat much sugar. Has a job where she sits all day, has a hard time getting exercise, though has a treadmill at home. She declines interest in group class or one on one follow up as she states her brother has diabetes as does a good friend. I encouraged her to reach out to our team if she has further questions or concerns in the future and she is agreeable.     Patient's most recent   Lab Results   Component Value Date    A1C 6.6 01/15/2025    A1C 5.5 08/09/2019     is meeting goal of <7.0    Diabetes knowledge and skills assessment:   Patient is knowledgeable in diabetes management concepts related to: none, new diagnosis     Based on learning assessment above, most appropriate setting for further diabetes education would be: Group class or Individual setting.    Care Plan and Education Provided:  Healthy Eating: Consistency in amount and timing of carbohydrate intake, Being Active: Relationship of activity to glucose, and Monitoring: Frequency of monitoring and Individual glucose  targets    Patient verbalized understanding of diabetes self-management education concepts discussed, opportunities for ongoing education and support, and recommendations provided today.    Plan    Continue to check blood sugars and bring to follow up with PCP   A1C recheck every 3-6 months   Consider follow up with diabetes ed in the future, as needed - please re-refer as patient declines follow up today     Topics to cover at upcoming visits: Healthy Eating, Being Active, Monitoring, Taking Medication, Problem Solving, Reducing Risks, and Healthy Coping    Follow-up:patient declines today      See Care Plan for co-developed, patient-state behavior change goals.    Education Materials Provided:  -  [employer] Understanding Diabetes Booklet   - My Plate Planner   - Blood Glucose Log Sheet   - Goals for Your Diabetes Care   - Types of Diabetes Medicines  - ADCES Diabetes Distress handout      Subjective/Objective  Keena is an 64 year old year old, presenting for the following diabetes education related to: Presents for: Initial Assessment for new diagnosis  Accompanied by: Self  Diabetes education in the past 24mo: No  Focus of Visit: Patient Unsure  Diabetes type: Type 2  Date of diagnosis: 1/2025  Disease course: Stable  How confident are you filling out medical forms by yourself:: Not Assessed  Diabetes management related comments/concerns: No questions  Other concerns:: None  Cultural Influences/Ethnic Background:  Not  or     Diabetes Symptoms & Complications:  Diabetes Related Symptoms: Polyuria (increased urination)  Weight trend: Decreasing  Symptom course: Stable  Disease course: Stable  Complications assessed today?: No    Patient Problem List and Family Medical History reviewed for relevant medical history, current medical status, and diabetes risk factors.    Vitals:  LMP 01/01/2002 (Approximate)   Estimated body mass index is 32.05 kg/m  as calculated from the following:    Height as  "of 2/18/25: 1.695 m (5' 6.75\").    Weight as of 2/18/25: 92.1 kg (203 lb 1.6 oz).   Last 3 BP:   BP Readings from Last 3 Encounters:   02/18/25 (!) 150/90   01/15/25 (!) 159/84   04/15/24 131/79       History   Smoking Status     Former     Types: Cigarettes   Smokeless Tobacco     Current       Labs:  Lab Results   Component Value Date    A1C 6.6 01/15/2025    A1C 5.5 08/09/2019     Lab Results   Component Value Date     01/15/2025     06/30/2021     Lab Results   Component Value Date     01/15/2025     06/30/2021     HDL Cholesterol   Date Value Ref Range Status   06/30/2021 47 (L) >49 mg/dL Final     Direct Measure HDL   Date Value Ref Range Status   01/15/2025 40 (L) >=50 mg/dL Final   ]  GFR Estimate   Date Value Ref Range Status   01/15/2025 >90 >60 mL/min/1.73m2 Final     Comment:     eGFR calculated using 2021 CKD-EPI equation.   06/30/2021 >90 >60 mL/min/[1.73_m2] Final     Comment:     Non  GFR Calc  Starting 12/18/2018, serum creatinine based estimated GFR (eGFR) will be   calculated using the Chronic Kidney Disease Epidemiology Collaboration   (CKD-EPI) equation.       GFR Estimate If Black   Date Value Ref Range Status   06/30/2021 >90 >60 mL/min/[1.73_m2] Final     Comment:      GFR Calc  Starting 12/18/2018, serum creatinine based estimated GFR (eGFR) will be   calculated using the Chronic Kidney Disease Epidemiology Collaboration   (CKD-EPI) equation.       Lab Results   Component Value Date    CR 0.71 01/15/2025    CR 0.71 06/30/2021     No results found for: \"MICROALBUMIN\"    Healthy Eating:  Healthy Eating Assessed Today: Yes  Who cooks/prepares meals for you?: Spouse  Who purchases food in  your home?: Spouse  Meals include: Breakfast, Dinner  Breakfast: skips OR weekend - scrambled eggs, cheese, fried potatoes, 1-2 bird, coffee or water  Lunch: skips  Dinner: 4p - steaks, baked potato, beans or broccoli OR chicken, starch, " vegetable  Snacks: packet of fruit snacks - rarely OR celery or carrots  Beverages: Water, Coffee, Diet soda  Has patient met with a dietitian in the past?: No    Being Active:  Being Active Assessed Today: Yes  Exercise:: Currently not exercising  Barrier to exercise: None    Monitoring:  Monitoring Assessed Today: Yes  Blood Glucose Meter: Accu-chek  Times checking blood sugar at home (number): Other (see Comments)  Please elaborate:: just got meter and testing only occasionally    2 hr PP -- 106    Renata Macias RD  Time Spent: 15 minutes  Encounter Type: Individual    Any diabetes medication dose changes were made via the CDCES Standing Orders under the patient's referring provider.

## 2025-03-10 NOTE — PROGRESS NOTES
Diabetes Self-Management Education & Support    Presents for: Initial Assessment for new diagnosis    Type of Service: Telephone Visit    Originating Location (Patient Location): Home  Distant Location (Provider Location): Offsite  Mode of Communication:  Telephone    Telephone Visit Start Time:  3:35p  Telephone Visit End Time (telephone visit stop time): 3:50p    How would patient like to obtain AVS? MyChart      Assessment  Keena is here for new diagnosis education. She reports she has no questions at time of visit, feeling fine about having diabetes and hasn't really had any symptoms of her diabetes. She eats 1 meal/day, sometimes 2 and has done this her whole life. Doesn't eat much sugar. Has a job where she sits all day, has a hard time getting exercise, though has a treadmill at home. She declines interest in group class or one on one follow up as she states her brother has diabetes as does a good friend. I encouraged her to reach out to our team if she has further questions or concerns in the future and she is agreeable.     Patient's most recent   Lab Results   Component Value Date    A1C 6.6 01/15/2025    A1C 5.5 08/09/2019     is meeting goal of <7.0    Diabetes knowledge and skills assessment:   Patient is knowledgeable in diabetes management concepts related to: none, new diagnosis     Based on learning assessment above, most appropriate setting for further diabetes education would be: Group class or Individual setting.    Care Plan and Education Provided:  Healthy Eating: Consistency in amount and timing of carbohydrate intake, Being Active: Relationship of activity to glucose, and Monitoring: Frequency of monitoring and Individual glucose targets    Patient verbalized understanding of diabetes self-management education concepts discussed, opportunities for ongoing education and support, and recommendations provided today.    Plan    Continue to check blood sugars and bring to follow up with PCP   A1C  "recheck every 3-6 months   Consider follow up with diabetes ed in the future, as needed - please re-refer as patient declines follow up today     Topics to cover at upcoming visits: Healthy Eating, Being Active, Monitoring, Taking Medication, Problem Solving, Reducing Risks, and Healthy Coping    Follow-up:patient declines today      See Care Plan for co-developed, patient-state behavior change goals.    Education Materials Provided:  - [employer] Understanding Diabetes Booklet   - My Plate Planner   - Blood Glucose Log Sheet   - Goals for Your Diabetes Care   - Types of Diabetes Medicines  - ADCES Diabetes Distress handout      Subjective/Objective  Keena is an 64 year old year old, presenting for the following diabetes education related to: Presents for: Initial Assessment for new diagnosis  Accompanied by: Self  Diabetes education in the past 24mo: No  Focus of Visit: Patient Unsure  Diabetes type: Type 2  Date of diagnosis: 1/2025  Disease course: Stable  How confident are you filling out medical forms by yourself:: Not Assessed  Diabetes management related comments/concerns: No questions  Other concerns:: None  Cultural Influences/Ethnic Background:  Not  or     Diabetes Symptoms & Complications:  Diabetes Related Symptoms: Polyuria (increased urination)  Weight trend: Decreasing  Symptom course: Stable  Disease course: Stable  Complications assessed today?: No    Patient Problem List and Family Medical History reviewed for relevant medical history, current medical status, and diabetes risk factors.    Vitals:  LMP 01/01/2002 (Approximate)   Estimated body mass index is 32.05 kg/m  as calculated from the following:    Height as of 2/18/25: 1.695 m (5' 6.75\").    Weight as of 2/18/25: 92.1 kg (203 lb 1.6 oz).   Last 3 BP:   BP Readings from Last 3 Encounters:   02/18/25 (!) 150/90   01/15/25 (!) 159/84   04/15/24 131/79       History   Smoking Status    Former    Types: Cigarettes " "  Smokeless Tobacco    Current       Labs:  Lab Results   Component Value Date    A1C 6.6 01/15/2025    A1C 5.5 08/09/2019     Lab Results   Component Value Date     01/15/2025     06/30/2021     Lab Results   Component Value Date     01/15/2025     06/30/2021     HDL Cholesterol   Date Value Ref Range Status   06/30/2021 47 (L) >49 mg/dL Final     Direct Measure HDL   Date Value Ref Range Status   01/15/2025 40 (L) >=50 mg/dL Final   ]  GFR Estimate   Date Value Ref Range Status   01/15/2025 >90 >60 mL/min/1.73m2 Final     Comment:     eGFR calculated using 2021 CKD-EPI equation.   06/30/2021 >90 >60 mL/min/[1.73_m2] Final     Comment:     Non  GFR Calc  Starting 12/18/2018, serum creatinine based estimated GFR (eGFR) will be   calculated using the Chronic Kidney Disease Epidemiology Collaboration   (CKD-EPI) equation.       GFR Estimate If Black   Date Value Ref Range Status   06/30/2021 >90 >60 mL/min/[1.73_m2] Final     Comment:      GFR Calc  Starting 12/18/2018, serum creatinine based estimated GFR (eGFR) will be   calculated using the Chronic Kidney Disease Epidemiology Collaboration   (CKD-EPI) equation.       Lab Results   Component Value Date    CR 0.71 01/15/2025    CR 0.71 06/30/2021     No results found for: \"MICROALBUMIN\"    Healthy Eating:  Healthy Eating Assessed Today: Yes  Who cooks/prepares meals for you?: Spouse  Who purchases food in  your home?: Spouse  Meals include: Breakfast, Dinner  Breakfast: skips OR weekend - scrambled eggs, cheese, fried potatoes, 1-2 bird, coffee or water  Lunch: skips  Dinner: 4p - steaks, baked potato, beans or broccoli OR chicken, starch, vegetable  Snacks: packet of fruit snacks - rarely OR celery or carrots  Beverages: Water, Coffee, Diet soda  Has patient met with a dietitian in the past?: No    Being Active:  Being Active Assessed Today: Yes  Exercise:: Currently not exercising  Barrier to exercise: " None    Monitoring:  Monitoring Assessed Today: Yes  Blood Glucose Meter: Accu-chek  Times checking blood sugar at home (number): Other (see Comments)  Please elaborate:: just got meter and testing only occasionally    2 hr PP -- 106    Renata Macias RD  Time Spent: 15 minutes  Encounter Type: Individual    Any diabetes medication dose changes were made via the CDCES Standing Orders under the patient's referring provider.

## 2025-03-13 ENCOUNTER — TRANSFERRED RECORDS (OUTPATIENT)
Dept: MULTI SPECIALTY CLINIC | Facility: CLINIC | Age: 65
End: 2025-03-13
Payer: COMMERCIAL

## 2025-03-13 LAB — RETINOPATHY: NORMAL

## 2025-03-19 ENCOUNTER — OFFICE VISIT (OUTPATIENT)
Dept: INTERNAL MEDICINE | Facility: CLINIC | Age: 65
End: 2025-03-19
Payer: COMMERCIAL

## 2025-03-19 VITALS
DIASTOLIC BLOOD PRESSURE: 72 MMHG | RESPIRATION RATE: 14 BRPM | TEMPERATURE: 96 F | SYSTOLIC BLOOD PRESSURE: 118 MMHG | WEIGHT: 200.3 LBS | HEIGHT: 67 IN | OXYGEN SATURATION: 98 % | BODY MASS INDEX: 31.44 KG/M2 | HEART RATE: 96 BPM

## 2025-03-19 DIAGNOSIS — I10 PRIMARY HYPERTENSION: Primary | ICD-10-CM

## 2025-03-19 DIAGNOSIS — E55.9 VITAMIN D DEFICIENCY: ICD-10-CM

## 2025-03-19 DIAGNOSIS — E11.9 TYPE 2 DIABETES MELLITUS WITHOUT COMPLICATION, WITHOUT LONG-TERM CURRENT USE OF INSULIN (H): ICD-10-CM

## 2025-03-19 DIAGNOSIS — E78.2 MIXED HYPERLIPIDEMIA: ICD-10-CM

## 2025-03-19 PROCEDURE — 3078F DIAST BP <80 MM HG: CPT | Performed by: INTERNAL MEDICINE

## 2025-03-19 PROCEDURE — 99214 OFFICE O/P EST MOD 30 MIN: CPT | Performed by: INTERNAL MEDICINE

## 2025-03-19 PROCEDURE — 3074F SYST BP LT 130 MM HG: CPT | Performed by: INTERNAL MEDICINE

## 2025-03-19 PROCEDURE — G2211 COMPLEX E/M VISIT ADD ON: HCPCS | Performed by: INTERNAL MEDICINE

## 2025-03-19 RX ORDER — LISINOPRIL AND HYDROCHLOROTHIAZIDE 10; 12.5 MG/1; MG/1
1 TABLET ORAL DAILY
Qty: 90 TABLET | Refills: 1 | Status: SHIPPED | OUTPATIENT
Start: 2025-03-19

## 2025-03-19 NOTE — PROGRESS NOTES
Assessment & Plan     (I10) Primary hypertension  (primary encounter diagnosis)  Comment: Blood pressure is significantly improved  Plan: Refilled lisinopril-hydrochlorothiazide (ZESTORETIC) 10-12.5 MG tablet as directed.explained clearly about the medication,insructions and side effects.  Advised to follow low salt diet and exercise and f/u in 4-5 months        (E78.2) Mixed hyperlipidemia  Plan: On Crestor 5 mg daily, tolerating well, repeat lipid panel reflex to direct LDL Fasting, ALT, AST on 04/25             (E11.9) Type 2 diabetes mellitus without complication, without long-term current use of insulin (H)  Plan: On ADA diet and regular exercise, check Hemoglobin A1c in 04/25             (E55.9) Vitamin D deficiency  Has completed high-dose vitamin D, currently on over-the-counter vitamin D supplements, checkPlan: Vitamin D level in 04/25              The longitudinal plan of care for the diagnosis(es)/condition(s) as documented were addressed during this visit. Due to the added complexity in care, I will continue to support Keena in the subsequent management and with ongoing continuity of care.      Jeronimo Brink is a 64 year old, presenting for the following health issues:  Recheck Medication (Lisinopril-HTZ)      3/19/2025     8:44 AM   Additional Questions   Roomed by geraldine   Accompanied by self         3/19/2025     8:44 AM   Patient Reported Additional Medications   Patient reports taking the following new medications Lisinopril-HTZ     Via the Health Maintenance questionnaire, the patient has reported the following services have been completed -Eye Exam: [facility] 2025-03-13, this information has been sent to the abstraction team.  History of Present Illness       Hypertension: She presents for follow up of hypertension.  She does check blood pressure  regularly outside of the clinic. Outside blood pressures have been over 140/90. She follows a low salt diet.     She eats 0-1 servings of  fruits and vegetables daily.She consumes 0 sweetened beverage(s) daily.She exercises with enough effort to increase her heart rate 10 to 19 minutes per day.  She exercises with enough effort to increase her heart rate 5 days per week.   She is taking medications regularly.        Hypertension Follow-up    Do you check your blood pressure regularly outside of the clinic? Yes , was started on lisinopril/hydrochlorothiazide at last office visit, tolerating well with good blood pressure control  Are you following a low salt diet? Yes  Are your blood pressures ever more than 140 on the top number (systolic) OR more   than 90 on the bottom number (diastolic), for example 140/90? Yes     Diabetes Follow-up- new onset     How often are you checking your blood sugar? One time daily blood sugars are in 130s   What time of day are you checking your blood sugars (select all that apply)?  Before meals  Have you had any blood sugars above 200?  No  Have you had any blood sugars below 70?  No  What symptoms do you notice when your blood sugar is low?  None  What concerns do you have today about your diabetes? None   Do you have any of these symptoms? (Select all that apply)  No numbness or tingling in feet.  No redness, sores or blisters on feet.  No complaints of excessive thirst.  No reports of blurry vision.  No significant changes to weight.  [facility]      Hyperlipidemia Follow-Up    Are you regularly taking any medication or supplement to lower your cholesterol?   Yes- crestor   started on Crestor 1 month ago, tolerating well  Are you having muscle aches or other side effects that you think could be caused by your cholesterol lowering medication?  No     History of vitamin D deficiency;  has completed 8 weeks of 50,000 units of vitamin D and currently taking over-the-counter vitamin D        Past Medical History:   Diagnosis Date    PONV (postoperative nausea and vomiting)     Primary  hypertension 1/15/2025    Type 2 diabetes mellitus without complication, without long-term current use of insulin (H) 2/18/2025       Current Outpatient Medications   Medication Sig Dispense Refill    albuterol (PROAIR HFA/PROVENTIL HFA/VENTOLIN HFA) 108 (90 Base) MCG/ACT inhaler Inhale 2 puffs into the lungs every 6 hours as needed for shortness of breath, wheezing or cough. 18 g 3    blood glucose (NO BRAND SPECIFIED) test strip Use to test blood sugar 1 times daily or as directed. To accompany: Blood Glucose Monitor Brands: per insurance. 100 strip 6    blood glucose monitoring (NO BRAND SPECIFIED) meter device kit Use to test blood sugar 1 times daily or as directed. Preferred blood glucose meter OR supplies to accompany: Blood Glucose Monitor Brands: per insurance. 1 kit 0    fluticasone-salmeterol (ADVAIR) 100-50 MCG/ACT inhaler Inhale 1 puff into the lungs every 12 hours. 180 each 3    ibuprofen (ADVIL/MOTRIN) 200 MG tablet Take 3 tablets (600 mg) by mouth every 6 hours as needed for moderate pain (4-6)      lisinopril-hydrochlorothiazide (ZESTORETIC) 10-12.5 MG tablet Take 1 tablet by mouth daily. Profile Rx: patient will contact pharmacy when needed 90 tablet 1    rosuvastatin (CRESTOR) 5 MG tablet Take 1 tablet (5 mg) by mouth daily. 90 tablet 0    thin (NO BRAND SPECIFIED) lancets Use with lanceting device. To accompany: Blood Glucose Monitor Brands: per insurance. 100 each 6    vitamin D3 (CHOLECALCIFEROL) 1.25 MG (88326 UT) capsule Take 1 capsule (50,000 Units) by mouth once a week. 8 capsule 0    valACYclovir (VALTREX) 1000 mg tablet Take 1 tablet (1,000 mg) by mouth 3 times daily for 7 days 21 tablet 0           Review of Systems  CONSTITUTIONAL: NEGATIVE for fever, chills,    RESP: NEGATIVE for significant cough or SOB  CV: NEGATIVE for chest pain, palpitations or peripheral edema  ENDOCRINE: NEGATIVE for temperature intolerance, skin/hair changes      Objective    /72   Pulse 96   Temp (!)  "96  F (35.6  C) (Tympanic)   Resp 14   Ht 1.695 m (5' 6.75\")   Wt 90.9 kg (200 lb 4.8 oz)   LMP 01/01/2002 (Approximate)   SpO2 98%   BMI 31.61 kg/m    Body mass index is 31.61 kg/m .  Physical Exam   GENERAL: alert and no distress  EYES: Eyes grossly normal to inspection, PERRL and conjunctivae and sclerae normal  RESP: lungs clear to auscultation - no rales, rhonchi or wheezes  CV: regular rate and rhythm, normal S1 S2,    MS: no gross musculoskeletal defects noted, no edema          Please abstract the following data from this visit with this patient into the appropriate field in Epic:   Eye exam with ophthalmology on this date: Last eye exam 3/13/2025 at [facility]       Signed Electronically by: Sumit Rodríguez MD    "

## 2025-03-19 NOTE — NURSING NOTE
"/72   Pulse 96   Temp (!) 96  F (35.6  C) (Tympanic)   Resp 14   Ht 1.695 m (5' 6.75\")   Wt 90.9 kg (200 lb 4.8 oz)   LMP 01/01/2002 (Approximate)   SpO2 98%   BMI 31.61 kg/m      "

## 2025-03-19 NOTE — Clinical Note
Please abstract the following data from this visit with this patient into the appropriate field in Epic:   Eye exam with ophthalmology on this date: Last eye exam 3/13/2025 at [facility]

## 2025-04-16 ENCOUNTER — LAB (OUTPATIENT)
Dept: LAB | Facility: CLINIC | Age: 65
End: 2025-04-16
Payer: COMMERCIAL

## 2025-04-16 DIAGNOSIS — E78.2 MIXED HYPERLIPIDEMIA: ICD-10-CM

## 2025-04-16 DIAGNOSIS — E11.9 TYPE 2 DIABETES MELLITUS WITHOUT COMPLICATION, WITHOUT LONG-TERM CURRENT USE OF INSULIN (H): ICD-10-CM

## 2025-04-16 DIAGNOSIS — E55.9 VITAMIN D DEFICIENCY: ICD-10-CM

## 2025-04-16 LAB
ALT SERPL W P-5'-P-CCNC: 25 U/L (ref 0–50)
AST SERPL W P-5'-P-CCNC: 19 U/L (ref 0–45)
CHOLEST SERPL-MCNC: 165 MG/DL
EST. AVERAGE GLUCOSE BLD GHB EST-MCNC: 140 MG/DL
FASTING STATUS PATIENT QL REPORTED: YES
HBA1C MFR BLD: 6.5 % (ref 0–5.6)
HDLC SERPL-MCNC: 43 MG/DL
LDLC SERPL CALC-MCNC: 100 MG/DL
NONHDLC SERPL-MCNC: 122 MG/DL
TRIGL SERPL-MCNC: 108 MG/DL
VIT D+METAB SERPL-MCNC: 93 NG/ML (ref 20–50)

## 2025-04-16 PROCEDURE — 82306 VITAMIN D 25 HYDROXY: CPT

## 2025-04-16 PROCEDURE — 84460 ALANINE AMINO (ALT) (SGPT): CPT

## 2025-04-16 PROCEDURE — 84450 TRANSFERASE (AST) (SGOT): CPT

## 2025-04-16 PROCEDURE — 36415 COLL VENOUS BLD VENIPUNCTURE: CPT

## 2025-04-16 PROCEDURE — 83036 HEMOGLOBIN GLYCOSYLATED A1C: CPT

## 2025-04-16 PROCEDURE — 80061 LIPID PANEL: CPT

## 2025-05-14 ENCOUNTER — DOCUMENTATION ONLY (OUTPATIENT)
Dept: LAB | Facility: CLINIC | Age: 65
End: 2025-05-14
Payer: COMMERCIAL

## 2025-05-14 DIAGNOSIS — E67.3 HIGH VITAMIN D LEVEL: Primary | ICD-10-CM

## 2025-05-14 NOTE — PROGRESS NOTES
"Keena Jorge has an upcoming lab appointment:    Future Appointments   Date Time Provider Department [place]   5/28/2025  7:30 AM [facility]   8/19/2025  7:30 AM Sumit Rodríguez MD [facility]     Patient is scheduled for the following lab(s): \"Recheck labs\"    There is no order available. Please review and place either future orders or HMPO (Review of Health Maintenance Protocol Orders), as appropriate.    Health Maintenance Due   Topic    ANNUAL REVIEW OF HM ORDERS      Adriel Denise    "

## 2025-05-28 ENCOUNTER — LAB (OUTPATIENT)
Dept: LAB | Facility: CLINIC | Age: 65
End: 2025-05-28
Payer: COMMERCIAL

## 2025-05-28 DIAGNOSIS — E67.3 HIGH VITAMIN D LEVEL: ICD-10-CM

## 2025-05-28 LAB — VIT D+METAB SERPL-MCNC: 48 NG/ML (ref 20–50)

## 2025-05-28 PROCEDURE — 36415 COLL VENOUS BLD VENIPUNCTURE: CPT

## 2025-05-28 PROCEDURE — 82306 VITAMIN D 25 HYDROXY: CPT

## 2025-06-02 ENCOUNTER — RESULTS FOLLOW-UP (OUTPATIENT)
Dept: INTERNAL MEDICINE | Facility: CLINIC | Age: 65
End: 2025-06-02

## 2025-07-19 ENCOUNTER — HEALTH MAINTENANCE LETTER (OUTPATIENT)
Age: 65
End: 2025-07-19

## 2025-08-05 ENCOUNTER — PATIENT OUTREACH (OUTPATIENT)
Dept: CARE COORDINATION | Facility: CLINIC | Age: 65
End: 2025-08-05
Payer: COMMERCIAL

## 2025-08-12 DIAGNOSIS — E78.2 MIXED HYPERLIPIDEMIA: ICD-10-CM

## 2025-08-12 RX ORDER — ROSUVASTATIN CALCIUM 5 MG/1
5 TABLET, COATED ORAL DAILY
Qty: 90 TABLET | Refills: 0 | Status: SHIPPED | OUTPATIENT
Start: 2025-08-12

## 2025-08-19 ENCOUNTER — OFFICE VISIT (OUTPATIENT)
Dept: INTERNAL MEDICINE | Facility: CLINIC | Age: 65
End: 2025-08-19
Payer: COMMERCIAL

## 2025-08-19 VITALS
HEIGHT: 67 IN | BODY MASS INDEX: 31.44 KG/M2 | WEIGHT: 200.3 LBS | HEART RATE: 86 BPM | SYSTOLIC BLOOD PRESSURE: 132 MMHG | OXYGEN SATURATION: 94 % | DIASTOLIC BLOOD PRESSURE: 82 MMHG | RESPIRATION RATE: 18 BRPM | TEMPERATURE: 97.1 F

## 2025-08-19 DIAGNOSIS — I10 PRIMARY HYPERTENSION: ICD-10-CM

## 2025-08-19 DIAGNOSIS — Z53.20 PAP SMEAR OF CERVIX DECLINED: ICD-10-CM

## 2025-08-19 DIAGNOSIS — E11.9 TYPE 2 DIABETES MELLITUS WITHOUT COMPLICATION, WITHOUT LONG-TERM CURRENT USE OF INSULIN (H): Primary | ICD-10-CM

## 2025-08-19 DIAGNOSIS — E78.2 MIXED HYPERLIPIDEMIA: ICD-10-CM

## 2025-08-19 DIAGNOSIS — Z53.20 MAMMOGRAM DECLINED: ICD-10-CM

## 2025-08-19 DIAGNOSIS — Z53.20 COLONOSCOPY REFUSED: ICD-10-CM

## 2025-08-19 LAB
ALBUMIN SERPL BCG-MCNC: 4.3 G/DL (ref 3.5–5.2)
ALP SERPL-CCNC: 76 U/L (ref 40–150)
ALT SERPL W P-5'-P-CCNC: 27 U/L (ref 0–50)
ANION GAP SERPL CALCULATED.3IONS-SCNC: 12 MMOL/L (ref 7–15)
AST SERPL W P-5'-P-CCNC: 23 U/L (ref 0–45)
BILIRUB SERPL-MCNC: 0.2 MG/DL
BUN SERPL-MCNC: 12.5 MG/DL (ref 8–23)
CALCIUM SERPL-MCNC: 10 MG/DL (ref 8.8–10.4)
CHLORIDE SERPL-SCNC: 98 MMOL/L (ref 98–107)
CHOLEST SERPL-MCNC: 185 MG/DL
CREAT SERPL-MCNC: 0.75 MG/DL (ref 0.51–0.95)
EGFRCR SERPLBLD CKD-EPI 2021: 88 ML/MIN/1.73M2
EST. AVERAGE GLUCOSE BLD GHB EST-MCNC: 148 MG/DL
FASTING STATUS PATIENT QL REPORTED: YES
FASTING STATUS PATIENT QL REPORTED: YES
GLUCOSE SERPL-MCNC: 134 MG/DL (ref 70–99)
HBA1C MFR BLD: 6.8 % (ref 0–5.6)
HCO3 SERPL-SCNC: 26 MMOL/L (ref 22–29)
HDLC SERPL-MCNC: 43 MG/DL
LDLC SERPL CALC-MCNC: 109 MG/DL
NONHDLC SERPL-MCNC: 142 MG/DL
POTASSIUM SERPL-SCNC: 4.4 MMOL/L (ref 3.4–5.3)
PROT SERPL-MCNC: 7 G/DL (ref 6.4–8.3)
SODIUM SERPL-SCNC: 136 MMOL/L (ref 135–145)
TRIGL SERPL-MCNC: 163 MG/DL

## 2025-08-19 PROCEDURE — 36415 COLL VENOUS BLD VENIPUNCTURE: CPT | Performed by: INTERNAL MEDICINE

## 2025-08-19 PROCEDURE — 80053 COMPREHEN METABOLIC PANEL: CPT | Performed by: INTERNAL MEDICINE

## 2025-08-19 PROCEDURE — 99214 OFFICE O/P EST MOD 30 MIN: CPT | Performed by: INTERNAL MEDICINE

## 2025-08-19 PROCEDURE — 3049F LDL-C 100-129 MG/DL: CPT | Performed by: INTERNAL MEDICINE

## 2025-08-19 PROCEDURE — 3075F SYST BP GE 130 - 139MM HG: CPT | Performed by: INTERNAL MEDICINE

## 2025-08-19 PROCEDURE — 3079F DIAST BP 80-89 MM HG: CPT | Performed by: INTERNAL MEDICINE

## 2025-08-19 PROCEDURE — 3044F HG A1C LEVEL LT 7.0%: CPT | Performed by: INTERNAL MEDICINE

## 2025-08-19 PROCEDURE — 80061 LIPID PANEL: CPT | Performed by: INTERNAL MEDICINE

## 2025-08-19 PROCEDURE — 83036 HEMOGLOBIN GLYCOSYLATED A1C: CPT | Performed by: INTERNAL MEDICINE

## 2025-08-19 PROCEDURE — G2211 COMPLEX E/M VISIT ADD ON: HCPCS | Performed by: INTERNAL MEDICINE

## 2025-08-19 RX ORDER — LISINOPRIL AND HYDROCHLOROTHIAZIDE 10; 12.5 MG/1; MG/1
1 TABLET ORAL DAILY
Qty: 90 TABLET | Refills: 1 | Status: SHIPPED | OUTPATIENT
Start: 2025-08-19

## (undated) DEVICE — LINEN HALF SHEET 5512

## (undated) DEVICE — SOL NACL 0.9% IRRIG 3000ML BAG 2B7477

## (undated) DEVICE — ESU ELEC BLADE 2.75" COATED/INSULATED E1455

## (undated) DEVICE — CLIP APPLIER ENDO 5MM M/L LIGAMAX EL5ML

## (undated) DEVICE — DECANTER VIAL 2006S

## (undated) DEVICE — SU VICRYL 0 CT-2 CR 8X18" J727D

## (undated) DEVICE — ENDO TRAP POLYP QUICK CATCH 710201

## (undated) DEVICE — SUCTION IRR STRYKERFLOW II W/TIP 250-070-520

## (undated) DEVICE — SU VICRYL 4-0 P-3 18" UND J494G

## (undated) DEVICE — GLOVE BIOGEL PI MICRO SZ 6.5 48565

## (undated) DEVICE — Device

## (undated) DEVICE — KIT ENDO TURNOVER/PROCEDURE W/CLEAN A SCOPE LINERS 103888

## (undated) DEVICE — LINEN TOWEL PACK X10 5473

## (undated) DEVICE — GLOVE BIOGEL PI MICRO INDICATOR UNDERGLOVE SZ 6.5 48965

## (undated) DEVICE — SU VICRYL 0 UR-6 27" J603H

## (undated) DEVICE — SYR 30ML LL W/O NDL 302832

## (undated) DEVICE — BAG CLEAR TRASH 1.3M 39X33" P4040C

## (undated) DEVICE — ESU GROUND PAD ADULT W/CORD E7507

## (undated) DEVICE — ENDO TROCAR BLUNT TIP KII BALLOON 12X100MM C0R47

## (undated) DEVICE — LINEN POUCH DBL 5427

## (undated) DEVICE — STPL POWERED ECHELON VASC 35MM PVE35A

## (undated) DEVICE — ENDO SNARE POLYPECTOMY OVAL 15MM LOOP SD-240U-15

## (undated) DEVICE — ESU PENCIL W/HOLSTER E2350H

## (undated) DEVICE — ENDO TROCAR FIRST ENTRY KII FIOS Z-THRD 05X100MM CTF03

## (undated) DEVICE — ENDO TROCAR SLEEVE KII Z-THREADED 05X100MM CTS02

## (undated) DEVICE — SU VICRYL 4-0 PS-2 18" UND J496H

## (undated) DEVICE — ESU CORD MONOPOLAR 10'  E0510

## (undated) DEVICE — LINEN FULL SHEET 5511

## (undated) RX ORDER — ONDANSETRON 2 MG/ML
INJECTION INTRAMUSCULAR; INTRAVENOUS
Status: DISPENSED
Start: 2019-09-12

## (undated) RX ORDER — FENTANYL CITRATE 50 UG/ML
INJECTION, SOLUTION INTRAMUSCULAR; INTRAVENOUS
Status: DISPENSED
Start: 2023-01-06

## (undated) RX ORDER — ONDANSETRON 2 MG/ML
INJECTION INTRAMUSCULAR; INTRAVENOUS
Status: DISPENSED
Start: 2023-01-06

## (undated) RX ORDER — CEFAZOLIN SODIUM/WATER 2 G/20 ML
SYRINGE (ML) INTRAVENOUS
Status: DISPENSED
Start: 2023-01-06

## (undated) RX ORDER — PROPOFOL 10 MG/ML
INJECTION, EMULSION INTRAVENOUS
Status: DISPENSED
Start: 2023-01-06

## (undated) RX ORDER — DEXAMETHASONE SODIUM PHOSPHATE 4 MG/ML
INJECTION, SOLUTION INTRA-ARTICULAR; INTRALESIONAL; INTRAMUSCULAR; INTRAVENOUS; SOFT TISSUE
Status: DISPENSED
Start: 2023-01-06

## (undated) RX ORDER — GLYCOPYRROLATE 0.2 MG/ML
INJECTION INTRAMUSCULAR; INTRAVENOUS
Status: DISPENSED
Start: 2023-01-06

## (undated) RX ORDER — BUPIVACAINE HYDROCHLORIDE 5 MG/ML
INJECTION, SOLUTION EPIDURAL; INTRACAUDAL
Status: DISPENSED
Start: 2023-01-06

## (undated) RX ORDER — SCOLOPAMINE TRANSDERMAL SYSTEM 1 MG/1
PATCH, EXTENDED RELEASE TRANSDERMAL
Status: DISPENSED
Start: 2023-01-06

## (undated) RX ORDER — KETOROLAC TROMETHAMINE 30 MG/ML
INJECTION, SOLUTION INTRAMUSCULAR; INTRAVENOUS
Status: DISPENSED
Start: 2023-01-06

## (undated) RX ORDER — LIDOCAINE HYDROCHLORIDE 10 MG/ML
INJECTION, SOLUTION EPIDURAL; INFILTRATION; INTRACAUDAL; PERINEURAL
Status: DISPENSED
Start: 2023-01-06

## (undated) RX ORDER — METOPROLOL TARTRATE 1 MG/ML
INJECTION, SOLUTION INTRAVENOUS
Status: DISPENSED
Start: 2023-01-06

## (undated) RX ORDER — LABETALOL HYDROCHLORIDE 5 MG/ML
INJECTION, SOLUTION INTRAVENOUS
Status: DISPENSED
Start: 2023-01-06

## (undated) RX ORDER — FENTANYL CITRATE 50 UG/ML
INJECTION, SOLUTION INTRAMUSCULAR; INTRAVENOUS
Status: DISPENSED
Start: 2019-09-12

## (undated) RX ORDER — NEOSTIGMINE METHYLSULFATE 1 MG/ML
VIAL (ML) INJECTION
Status: DISPENSED
Start: 2023-01-06

## (undated) RX ORDER — INDOCYANINE GREEN AND WATER 25 MG
KIT INJECTION
Status: DISPENSED
Start: 2023-01-06